# Patient Record
Sex: FEMALE | Race: WHITE | NOT HISPANIC OR LATINO | Employment: OTHER | URBAN - METROPOLITAN AREA
[De-identification: names, ages, dates, MRNs, and addresses within clinical notes are randomized per-mention and may not be internally consistent; named-entity substitution may affect disease eponyms.]

---

## 2017-06-14 ENCOUNTER — RECORDS - HEALTHEAST (OUTPATIENT)
Dept: LAB | Facility: CLINIC | Age: 77
End: 2017-06-14

## 2017-06-14 LAB
CHOLEST SERPL-MCNC: 266 MG/DL
FASTING STATUS PATIENT QL REPORTED: ABNORMAL
HDLC SERPL-MCNC: 58 MG/DL
LDLC SERPL CALC-MCNC: 165 MG/DL
TRIGL SERPL-MCNC: 216 MG/DL

## 2019-06-17 ENCOUNTER — RECORDS - HEALTHEAST (OUTPATIENT)
Dept: LAB | Facility: CLINIC | Age: 79
End: 2019-06-17

## 2019-06-17 LAB
ALBUMIN SERPL-MCNC: 4.1 G/DL (ref 3.5–5)
ALP SERPL-CCNC: 68 U/L (ref 45–120)
ALT SERPL W P-5'-P-CCNC: 18 U/L (ref 0–45)
ANION GAP SERPL CALCULATED.3IONS-SCNC: 8 MMOL/L (ref 5–18)
AST SERPL W P-5'-P-CCNC: 18 U/L (ref 0–40)
BILIRUB SERPL-MCNC: 0.4 MG/DL (ref 0–1)
BUN SERPL-MCNC: 24 MG/DL (ref 8–28)
CALCIUM SERPL-MCNC: 10.2 MG/DL (ref 8.5–10.5)
CHLORIDE BLD-SCNC: 102 MMOL/L (ref 98–107)
CHOLEST SERPL-MCNC: 189 MG/DL
CO2 SERPL-SCNC: 30 MMOL/L (ref 22–31)
CREAT SERPL-MCNC: 0.97 MG/DL (ref 0.6–1.1)
FASTING STATUS PATIENT QL REPORTED: ABNORMAL
GFR SERPL CREATININE-BSD FRML MDRD: 55 ML/MIN/1.73M2
GLUCOSE BLD-MCNC: 92 MG/DL (ref 70–125)
HDLC SERPL-MCNC: 63 MG/DL
LDLC SERPL CALC-MCNC: 79 MG/DL
POTASSIUM BLD-SCNC: 4.5 MMOL/L (ref 3.5–5)
PROT SERPL-MCNC: 6.5 G/DL (ref 6–8)
SODIUM SERPL-SCNC: 140 MMOL/L (ref 136–145)
TRIGL SERPL-MCNC: 236 MG/DL

## 2019-08-21 ENCOUNTER — RECORDS - HEALTHEAST (OUTPATIENT)
Dept: ADMINISTRATIVE | Facility: OTHER | Age: 79
End: 2019-08-21

## 2019-08-21 ENCOUNTER — AMBULATORY - HEALTHEAST (OUTPATIENT)
Dept: CARDIOLOGY | Facility: CLINIC | Age: 79
End: 2019-08-21

## 2019-08-23 ENCOUNTER — RECORDS - HEALTHEAST (OUTPATIENT)
Dept: ADMINISTRATIVE | Facility: OTHER | Age: 79
End: 2019-08-23

## 2019-08-23 ENCOUNTER — AMBULATORY - HEALTHEAST (OUTPATIENT)
Dept: CARDIOLOGY | Facility: CLINIC | Age: 79
End: 2019-08-23

## 2019-08-26 ENCOUNTER — OFFICE VISIT - HEALTHEAST (OUTPATIENT)
Dept: CARDIOLOGY | Facility: CLINIC | Age: 79
End: 2019-08-26

## 2019-08-26 DIAGNOSIS — I47.10 SVT (SUPRAVENTRICULAR TACHYCARDIA) (H): ICD-10-CM

## 2019-08-26 DIAGNOSIS — I47.20 VT (VENTRICULAR TACHYCARDIA) (H): ICD-10-CM

## 2019-08-26 DIAGNOSIS — R06.09 DOE (DYSPNEA ON EXERTION): ICD-10-CM

## 2019-08-26 RX ORDER — GINKGO BILOBA LEAF EXTRACT 60 MG
1 CAPSULE ORAL DAILY
Status: ON HOLD | COMMUNITY
Start: 2019-08-26 | End: 2022-08-11

## 2019-08-26 RX ORDER — ASCORBIC ACID 1000 MG
1 TABLET ORAL DAILY
Status: ON HOLD | COMMUNITY
Start: 2019-08-26 | End: 2022-08-11

## 2019-08-26 RX ORDER — ASPIRIN 325 MG
325 TABLET, DELAYED RELEASE (ENTERIC COATED) ORAL DAILY
Status: ON HOLD | COMMUNITY
Start: 2019-08-26 | End: 2022-08-12

## 2019-08-26 RX ORDER — CITALOPRAM HYDROBROMIDE 10 MG/1
10 TABLET ORAL DAILY
Status: SHIPPED | COMMUNITY
Start: 2019-08-26

## 2019-08-26 RX ORDER — ATORVASTATIN CALCIUM 10 MG/1
10 TABLET, FILM COATED ORAL DAILY
Status: SHIPPED | COMMUNITY
Start: 2019-08-26

## 2019-08-26 RX ORDER — ASCORBIC ACID 500 MG
500 TABLET ORAL DAILY
Status: SHIPPED | COMMUNITY
Start: 2019-08-26

## 2019-08-26 ASSESSMENT — MIFFLIN-ST. JEOR: SCORE: 1077.31

## 2019-09-03 ENCOUNTER — HOSPITAL ENCOUNTER (OUTPATIENT)
Dept: CARDIOLOGY | Facility: HOSPITAL | Age: 79
Discharge: HOME | End: 2019-09-03
Attending: INTERNAL MEDICINE

## 2019-09-03 DIAGNOSIS — R06.09 DOE (DYSPNEA ON EXERTION): ICD-10-CM

## 2019-09-03 DIAGNOSIS — I47.10 SVT (SUPRAVENTRICULAR TACHYCARDIA) (H): ICD-10-CM

## 2019-09-03 DIAGNOSIS — I47.20 VT (VENTRICULAR TACHYCARDIA) (H): ICD-10-CM

## 2019-09-03 DIAGNOSIS — R06.09 OTHER FORMS OF DYSPNEA: ICD-10-CM

## 2019-09-03 LAB
CV STRESS CURRENT BP HE: NORMAL
CV STRESS CURRENT HR HE: 100
CV STRESS CURRENT HR HE: 107
CV STRESS CURRENT HR HE: 108
CV STRESS CURRENT HR HE: 112
CV STRESS CURRENT HR HE: 114
CV STRESS CURRENT HR HE: 115
CV STRESS CURRENT HR HE: 115
CV STRESS CURRENT HR HE: 116
CV STRESS CURRENT HR HE: 116
CV STRESS CURRENT HR HE: 118
CV STRESS CURRENT HR HE: 118
CV STRESS CURRENT HR HE: 126
CV STRESS CURRENT HR HE: 73
CV STRESS CURRENT HR HE: 77
CV STRESS CURRENT HR HE: 79
CV STRESS CURRENT HR HE: 79
CV STRESS CURRENT HR HE: 81
CV STRESS CURRENT HR HE: 87
CV STRESS CURRENT HR HE: 89
CV STRESS CURRENT HR HE: 96
CV STRESS CURRENT HR HE: NORMAL
CV STRESS DEVIATION TIME HE: NORMAL
CV STRESS ECHO PERCENT HR HE: NORMAL
CV STRESS EXERCISE STAGE HE: NORMAL
CV STRESS FINAL RESTING BP HE: NORMAL
CV STRESS FINAL RESTING HR HE: 79
CV STRESS MAX HR HE: 128
CV STRESS MAX TREADMILL GRADE HE: 14
CV STRESS MAX TREADMILL SPEED HE: 3.4
CV STRESS PEAK DIA BP HE: NORMAL
CV STRESS PEAK SYS BP HE: NORMAL
CV STRESS PHASE HE: NORMAL
CV STRESS PROTOCOL HE: NORMAL
CV STRESS RESTING PT POSITION HE: NORMAL
CV STRESS RESTING PT POSITION HE: NORMAL
CV STRESS ST DEVIATION AMOUNT HE: NORMAL
CV STRESS ST DEVIATION ELEVATION HE: NORMAL
CV STRESS ST EVELATION AMOUNT HE: NORMAL
CV STRESS TEST TYPE HE: NORMAL
CV STRESS TOTAL STAGE TIME MIN 1 HE: NORMAL
ECHO EJECTION FRACTION ESTIMATED: 55 %
STRESS ECHO BASELINE BP: NORMAL
STRESS ECHO BASELINE HR: 72
STRESS ECHO CALCULATED PERCENT HR: 92 %
STRESS ECHO LAST STRESS BP: NORMAL
STRESS ECHO LAST STRESS HR: 126
STRESS ECHO POST ESTIMATED WORKLOAD: 7.3
STRESS ECHO POST EXERCISE DUR MIN: 6
STRESS ECHO POST EXERCISE DUR SEC: 0
STRESS ECHO TARGET HR: 118

## 2019-09-04 ENCOUNTER — AMBULATORY - HEALTHEAST (OUTPATIENT)
Dept: CARDIOLOGY | Facility: CLINIC | Age: 79
End: 2019-09-04

## 2019-09-04 ENCOUNTER — COMMUNICATION - HEALTHEAST (OUTPATIENT)
Dept: CARDIOLOGY | Facility: CLINIC | Age: 79
End: 2019-09-04

## 2019-09-04 DIAGNOSIS — I47.10 PAROXYSMAL SUPRAVENTRICULAR TACHYCARDIA (H): ICD-10-CM

## 2019-09-05 ENCOUNTER — COMMUNICATION - HEALTHEAST (OUTPATIENT)
Dept: CARDIOLOGY | Facility: CLINIC | Age: 79
End: 2019-09-05

## 2019-09-12 ENCOUNTER — OFFICE VISIT - HEALTHEAST (OUTPATIENT)
Dept: CARDIOLOGY | Facility: CLINIC | Age: 79
End: 2019-09-12

## 2019-09-12 DIAGNOSIS — I10 ESSENTIAL HYPERTENSION: ICD-10-CM

## 2019-09-12 DIAGNOSIS — Z86.718 HISTORY OF DEEP VENOUS THROMBOSIS: ICD-10-CM

## 2019-09-12 DIAGNOSIS — I47.10 SVT (SUPRAVENTRICULAR TACHYCARDIA) (H): ICD-10-CM

## 2019-09-13 LAB
ATRIAL RATE - MUSE: 57 BPM
DIASTOLIC BLOOD PRESSURE - MUSE: NORMAL
INTERPRETATION ECG - MUSE: NORMAL
P AXIS - MUSE: 68 DEGREES
PR INTERVAL - MUSE: 178 MS
QRS DURATION - MUSE: 90 MS
QT - MUSE: 412 MS
QTC - MUSE: 401 MS
R AXIS - MUSE: 53 DEGREES
SYSTOLIC BLOOD PRESSURE - MUSE: NORMAL
T AXIS - MUSE: 58 DEGREES
VENTRICULAR RATE- MUSE: 57 BPM

## 2019-09-16 ENCOUNTER — RECORDS - HEALTHEAST (OUTPATIENT)
Dept: ADMINISTRATIVE | Facility: OTHER | Age: 79
End: 2019-09-16

## 2019-09-16 ENCOUNTER — AMBULATORY - HEALTHEAST (OUTPATIENT)
Dept: CARDIOLOGY | Facility: CLINIC | Age: 79
End: 2019-09-16

## 2020-04-06 ENCOUNTER — COMMUNICATION - HEALTHEAST (OUTPATIENT)
Dept: CARDIOLOGY | Facility: CLINIC | Age: 80
End: 2020-04-06

## 2020-04-06 DIAGNOSIS — I47.10 PAROXYSMAL SUPRAVENTRICULAR TACHYCARDIA (H): ICD-10-CM

## 2020-07-21 ENCOUNTER — COMMUNICATION - HEALTHEAST (OUTPATIENT)
Dept: CARDIOLOGY | Facility: CLINIC | Age: 80
End: 2020-07-21

## 2020-07-21 DIAGNOSIS — I47.10 PAROXYSMAL SUPRAVENTRICULAR TACHYCARDIA (H): ICD-10-CM

## 2020-08-03 ENCOUNTER — COMMUNICATION - HEALTHEAST (OUTPATIENT)
Dept: CARDIOLOGY | Facility: CLINIC | Age: 80
End: 2020-08-03

## 2020-08-04 ENCOUNTER — OFFICE VISIT - HEALTHEAST (OUTPATIENT)
Dept: CARDIOLOGY | Facility: CLINIC | Age: 80
End: 2020-08-04

## 2020-08-04 DIAGNOSIS — I10 ESSENTIAL HYPERTENSION: ICD-10-CM

## 2020-08-04 DIAGNOSIS — I47.10 PAROXYSMAL SUPRAVENTRICULAR TACHYCARDIA (H): ICD-10-CM

## 2020-08-04 DIAGNOSIS — I47.10 SVT (SUPRAVENTRICULAR TACHYCARDIA) (H): ICD-10-CM

## 2020-08-04 ASSESSMENT — MIFFLIN-ST. JEOR: SCORE: 1041.02

## 2020-08-05 ENCOUNTER — COMMUNICATION - HEALTHEAST (OUTPATIENT)
Dept: CARDIOLOGY | Facility: CLINIC | Age: 80
End: 2020-08-05

## 2020-08-05 ENCOUNTER — RECORDS - HEALTHEAST (OUTPATIENT)
Dept: LAB | Facility: CLINIC | Age: 80
End: 2020-08-05

## 2020-08-05 DIAGNOSIS — I10 ESSENTIAL HYPERTENSION: ICD-10-CM

## 2020-08-05 DIAGNOSIS — I47.10 PAROXYSMAL SUPRAVENTRICULAR TACHYCARDIA (H): ICD-10-CM

## 2020-08-05 LAB
ALBUMIN SERPL-MCNC: 4 G/DL (ref 3.5–5)
ALP SERPL-CCNC: 66 U/L (ref 45–120)
ALT SERPL W P-5'-P-CCNC: 15 U/L (ref 0–45)
ANION GAP SERPL CALCULATED.3IONS-SCNC: 9 MMOL/L (ref 5–18)
AST SERPL W P-5'-P-CCNC: 15 U/L (ref 0–40)
BILIRUB SERPL-MCNC: 0.7 MG/DL (ref 0–1)
BUN SERPL-MCNC: 24 MG/DL (ref 8–28)
CALCIUM SERPL-MCNC: 9.7 MG/DL (ref 8.5–10.5)
CHLORIDE BLD-SCNC: 103 MMOL/L (ref 98–107)
CHOLEST SERPL-MCNC: 195 MG/DL
CO2 SERPL-SCNC: 29 MMOL/L (ref 22–31)
CREAT SERPL-MCNC: 1.12 MG/DL (ref 0.6–1.1)
FASTING STATUS PATIENT QL REPORTED: ABNORMAL
GFR SERPL CREATININE-BSD FRML MDRD: 47 ML/MIN/1.73M2
GLUCOSE BLD-MCNC: 104 MG/DL (ref 70–125)
HDLC SERPL-MCNC: 61 MG/DL
LDLC SERPL CALC-MCNC: 95 MG/DL
POTASSIUM BLD-SCNC: 4.5 MMOL/L (ref 3.5–5)
PROT SERPL-MCNC: 6.5 G/DL (ref 6–8)
SODIUM SERPL-SCNC: 141 MMOL/L (ref 136–145)
TRIGL SERPL-MCNC: 194 MG/DL

## 2021-02-01 ENCOUNTER — COMMUNICATION - HEALTHEAST (OUTPATIENT)
Dept: CARDIOLOGY | Facility: CLINIC | Age: 81
End: 2021-02-01

## 2021-02-01 DIAGNOSIS — I47.10 PAROXYSMAL SUPRAVENTRICULAR TACHYCARDIA (H): ICD-10-CM

## 2021-02-01 DIAGNOSIS — I10 ESSENTIAL HYPERTENSION: ICD-10-CM

## 2021-02-02 ENCOUNTER — COMMUNICATION - HEALTHEAST (OUTPATIENT)
Dept: CARDIOLOGY | Facility: CLINIC | Age: 81
End: 2021-02-02

## 2021-02-04 ENCOUNTER — COMMUNICATION - HEALTHEAST (OUTPATIENT)
Dept: CARDIOLOGY | Facility: CLINIC | Age: 81
End: 2021-02-04

## 2021-02-04 DIAGNOSIS — I47.10 PAROXYSMAL SUPRAVENTRICULAR TACHYCARDIA (H): ICD-10-CM

## 2021-02-04 DIAGNOSIS — I10 ESSENTIAL HYPERTENSION: ICD-10-CM

## 2021-02-04 RX ORDER — METOPROLOL SUCCINATE 50 MG/1
50 TABLET, EXTENDED RELEASE ORAL DAILY
Qty: 90 TABLET | Refills: 2 | Status: SHIPPED | OUTPATIENT
Start: 2021-02-04 | End: 2022-02-14 | Stop reason: DRUGHIGH

## 2021-03-03 ENCOUNTER — COMMUNICATION - HEALTHEAST (OUTPATIENT)
Dept: CARDIOLOGY | Facility: CLINIC | Age: 81
End: 2021-03-03

## 2021-03-03 DIAGNOSIS — I47.10 PAROXYSMAL SUPRAVENTRICULAR TACHYCARDIA (H): ICD-10-CM

## 2021-03-03 RX ORDER — FLECAINIDE ACETATE 50 MG/1
50 TABLET ORAL 2 TIMES DAILY
Qty: 180 TABLET | Refills: 1 | Status: SHIPPED | OUTPATIENT
Start: 2021-03-03 | End: 2021-08-02

## 2021-05-28 ENCOUNTER — RECORDS - HEALTHEAST (OUTPATIENT)
Dept: ADMINISTRATIVE | Facility: CLINIC | Age: 81
End: 2021-05-28

## 2021-06-01 NOTE — TELEPHONE ENCOUNTER
Pt contacted with results and recommendations as per Dr. Kumar she was advised to f/u with Dr. Kumar in 2 months time transferred to scheduling to arrange. Changed Flecainide rx to local pharmacy per patient request. sk/RN

## 2021-06-01 NOTE — TELEPHONE ENCOUNTER
Could patient follow-up with 1 of the [facility] nurse practitioners as I am treating her for paroxysmal atrial tachycardia and just want to see if she is doing okay with the flecainide?    KL

## 2021-06-01 NOTE — TELEPHONE ENCOUNTER
Message sent to sched with instructions to arrange f/u with AF NP prior to patient leaving [place] in Oct.  mg

## 2021-06-01 NOTE — PATIENT INSTRUCTIONS - HE
Destiney Agosto,    It was a pleasure to see you today at the [facility].     My recommendations after this visit include:    Flecainide appears to be working in your treatment of atrial tachycardia.    Please let us know if your symptoms do again worsen so we can evaluate for any atrial fibrillation which would be an additional arrhythmia.    Otherwise, in the meantime continue your flecainide and metoprolol.  Enjoy your winter in warmer weather and follow-up with Dr. Kumar when you return in the spring time.    Monalisa Loving NP-DAVY  [facility] [place] [facility], Electrophysiology  [phone]

## 2021-06-01 NOTE — TELEPHONE ENCOUNTER
----- Message from Tianna Kumar MD sent at 9/4/2019  2:03 PM CDT -----  Please let the patient know that her stress echo was negative for any signs of blood flow issues to her heart muscle.  At this point would consider a trial of flecainide 50 mg twice daily, in addition to her current dose of metoprolol.  Would have the patient follow-up with me in 2 months.    FLORIAN Kumar

## 2021-06-01 NOTE — TELEPHONE ENCOUNTER
----- Message from Milton Rodgers sent at 9/4/2019  3:27 PM CDT -----  Contact: Pt  General phone call:    Caller: Pt    Primary cardiologist: Dr Kumar    Detailed reason for call: Pt was to have a 2 MO FU per [employer] - Pt wasn't able to make anything work prior to her leaving the state in late October (did not take apts at [facility] and wasn't able to make 10/10 at [facility]) - Pt said she would follow up with a Dr out of [facility] if needed - wanted to let Dr Kumar know - Please call Pt if there are recommendations - thank you     New or active symptoms? na  Best phone number: home  Best time to contact: any  Ok to leave a detailed message? yes  Device? no    Additional Info:

## 2021-06-01 NOTE — TELEPHONE ENCOUNTER
Please review patient update regarding scheduling 2mo f/u as recommended after making medication changes (refer to stress echo results note) - should patient f/u with another provider?  mg

## 2021-06-03 VITALS
BODY MASS INDEX: 27.4 KG/M2 | SYSTOLIC BLOOD PRESSURE: 126 MMHG | HEART RATE: 55 BPM | WEIGHT: 145 LBS | RESPIRATION RATE: 16 BRPM | DIASTOLIC BLOOD PRESSURE: 64 MMHG | OXYGEN SATURATION: 97 %

## 2021-06-03 VITALS — BODY MASS INDEX: 28.51 KG/M2 | WEIGHT: 151 LBS | HEIGHT: 61 IN

## 2021-06-04 VITALS
HEART RATE: 60 BPM | SYSTOLIC BLOOD PRESSURE: 140 MMHG | RESPIRATION RATE: 16 BRPM | BODY MASS INDEX: 27 KG/M2 | WEIGHT: 143 LBS | HEIGHT: 61 IN | DIASTOLIC BLOOD PRESSURE: 64 MMHG

## 2021-06-10 NOTE — TELEPHONE ENCOUNTER
Phone call to patient - informed her of Dr. Kumar's response/recommendations - patient verbalized understanding that when her Rx for Metoprolol Succ 25mg arrives by mail she should take 2 tabs daily to replace Metoprolol Tart 25mg two times a day.  mg

## 2021-06-10 NOTE — TELEPHONE ENCOUNTER
Return call to patient who stated she has been taking Metoprolol Tart 25mg two times a day not Succinate which she realized after her appt yesterday - Rx for Tartrate was prescribed by [place] physician and Dr. Kumar sent Succinate Rx in yesterday but she did not request it.    Explained to patient that her med list at 8-4-20 visit indicated that she was taking Metoprolol Succ 25mg daily which is why Dr. Kumar refilled Rx, along with Flecainide - explained differences between Tartrate and Succinate - patient stated she would prefer Succinate because it's daily.    Informed patient that update would be forwarded to Dr. Kumar with request to confirm correct dosage - understanding verbalized.    Please review update and patient's request to change to Succinate - also confirm that requested Rx for Metoprolol Succ 25mg daily is equivalent to what patient is presently taking, Metoprolol Tart 25mg two times a day.  mg

## 2021-06-10 NOTE — TELEPHONE ENCOUNTER
Wellness Screening Tool  Symptom Screening:  Do you have one of the following NEW symptoms:    Fever (subjective or >100.0)?  No    A new cough?  No    Shortness of breath?  No     Chills? No     New loss of taste or smell? No     Generalized body aches? No     New persistent headache? No     New sore throat? No     Nausea, vomiting, or diarrhea?  No    Within the past 3 weeks, have you been exposed to someone with a known positive illness below:    COVID-19 (known or suspected)?  No    Chicken pox?  No    Mealses?  No    Pertussis?  No    Patient notified of visitor policy- They may have one person accompany them to their appointment, but they will need to wear a mask and will be screened upon arrival for symptoms: Yes  Pt informed to wear a mask: Yes  Pt notified if they develop any symptoms listed above, prior to their appointment, they are to call the clinic directly at [phone] for further instructions.  Yes  Patient's appointment status: Patient will be seen in clinic as scheduled on Tues 8-4-20 @ 1199 in  clinic appt with Dr. Kumar.  mg

## 2021-06-10 NOTE — TELEPHONE ENCOUNTER
----- Message from Elizabeth Josias sent at 8/5/2020 10:59 AM CDT -----  General phone call:    Caller: Patient  Primary cardiologist: Dr. Kumar  Detailed reason for call: Patient has questions about renewed medications she has plenty.  Patient has been taking metoprolol titrate, and Dr Kumar ordered the Metoprolol succinate.  Patient liked the Titrate.    New or active symptoms?   Best phone number: [phone]  Best time to contact: Anytime  Ok to leave a detailed message? Yes  Device? no    Additional Info:

## 2021-06-14 NOTE — TELEPHONE ENCOUNTER
----- Message from YUMIKO Mercedes sent at 2/2/2021  1:09 PM CST -----  Regarding: LAC[facility] PATIENT  General phone call:    Caller: Patient    Primary cardiologist: LACY    Detailed reason for call: Patient would like a from you, re: her medications    Best phone number: [phone]    Best time to contact: any    Ok to leave a detailed message? Yes    Device? no    Additional Info:

## 2021-06-14 NOTE — TELEPHONE ENCOUNTER
----- Message from YUMIKO Mercedes sent at 2/1/2021  2:30 PM CST -----  Regarding: LAC[facility] PATIENT  General phone call:    Caller: Patient    Primary cardiologist: LACY    Detailed reason for call: Patient has a question re: metoprolol succinate (TOPROL-XL) 25 MG    Best phone number: [phone]    Best time to contact: any    Ok to leave a detailed message? Yes    Device? no    Additional Info:

## 2021-06-14 NOTE — TELEPHONE ENCOUNTER
"Return call to patient who requested confirmation that Metoprolol Succ Rx was sent to preferred pharmacy because her spouse was informed this am that Rx was not rec'd.    Informed patient that records indicate \"E-Prescribing Status: Receipt confirmed by pharmacy (2/1/2021  3:11 PM CST)\" and that f/u call would be made to [facility] ([phone]) - understanding verbalized.      Phone call to [facility] Rx pharmacist who confirmed Rx in work queue awaiting to be filled which she would expedite.     Return call to patient with update - understanding verbalized. mg  "

## 2021-06-14 NOTE — TELEPHONE ENCOUNTER
Return call to patient who stated she is due for new Metoprolol Succ Rx and explained that dose was increased and she was taking 2 tabs.    Confirmed per 8-5-20 phone note, Dr. Kumar had changed Metoprolol Tart to Succinate and was to call back when original Rx was completed so new Rx for 50mg tabs could be submitted.    Patient confirmed preferred pharmacy for new Rx and pending f/u 8/2021 - order placed per protocol.  mg

## 2021-06-15 NOTE — TELEPHONE ENCOUNTER
----- Message from YUMIKO Mercedes sent at 2/4/2021  1:36 PM CST -----  Regarding: [facility] PATIENT  General phone call:    Caller: Patient    Primary cardiologist: LACY    Detailed reason for call: patient wants to talk to you, RE the RX issue that you and her have been dealing with.    Best phone number: [phone]    Best time to contact: any    Ok to leave a detailed message? Yes    Device? no    Additional Info:

## 2021-06-15 NOTE — TELEPHONE ENCOUNTER
"Return call to patient who stated her spouse recently found out that [facility] is not their mail order pharmacy any more, it is now [facility] and patient has been out of Metoprolol Succ for \"a couple of weeks now\" as her spouse had attempted to sort out the pharmacy coverage.    Patient requested 30 day supply be sent to [facility] in [place], [place] and then 90 day to [facility] - informed patient that request would be addressed - understanding verbalized. mg  "

## 2021-06-16 PROBLEM — I10 ESSENTIAL HYPERTENSION: Status: ACTIVE | Noted: 2019-09-12

## 2021-06-16 PROBLEM — I47.10 SVT (SUPRAVENTRICULAR TACHYCARDIA) (H): Status: ACTIVE | Noted: 2019-09-12

## 2021-06-16 PROBLEM — Z86.718 HISTORY OF DEEP VENOUS THROMBOSIS: Status: ACTIVE | Noted: 2019-09-12

## 2021-06-27 NOTE — PROGRESS NOTES
Progress Notes by Tianna Kumar MD at 8/26/2019  8:30 AM     Author: Tianna Kumar MD Service: -- Author Type: Physician    Filed: 8/26/2019  9:20 AM Encounter Date: 8/26/2019 Status: Signed    : Tianna Kumar MD (Physician)           Click to link to [facility]     [facility] HEART [facility] NOTE    Thank you, Dr. Moreno, for asking the [facility] team to see Ms. Destiney Agosto to evaluate palpitations.    Assessment/Recommendations   Assessment:    1.  Palpitations.  Patient wore an event monitor this past spring while in [place] demonstrating 2 runs of nonsustained ventricular tachycardia as well as 32 runs of a supraventricular tachycardia thought to be atrial tachycardia with variable block although this raises the question of paroxysmal atrial fibrillation although they felt this was less likely.  No change in medication was made at that time and she is now referred here for further recommendation.  She denies any associated symptoms of lightheadedness or near syncope.  We did talk about options of potentially increasing her metoprolol to 37-1/2 mg daily or adding flecainide at a low dose.  I would recommend a stress echo study prior to consideration of flecainide although understand she had coronary CT angiogram in 2007 which was negative for coronary disease.  2.  Essential hypertension, well controlled  3.  Hypercholesterolemia, now on statin therapy    Plan:  1.  Arrange stress echo study with further recommendations to follow       History of Present Illness    Ms. Destiney Agosto is a 79 y.o. female with history of essential hypertension, hypercholesterolemia and previous history of PVCs who presents to the cardiac clinic today for evaluation of her recent event monitor.  While wintering in [place], she had an event monitor performed for symptoms of palpitations.  She describes them as brief episodes of rapid heart beating.  These are not associated with symptoms of  lightheadedness or near syncope.  She denies any associated chest discomfort or shortness of breath.  She does report mild exertional dyspnea with climbing hills or climbing stairs but not with level ground.  There is no accompanying chest discomfort.  She denies orthopnea, PND or lower extremity edema.  She does report a prior work-up at the [facility] demonstrating a small patent ductus although I cannot find any records of this.  No treatment was recommended.    ECG (personally reviewed): No ECG today.  An event monitor performed in [place] revealed 32 runs of supraventricular tachycardia with the fastest consisting of 11 beats at a rate of 190 bpm.  The longest episode was 13 beats with a rate of 124.  Some episodes of SVT appeared to be possible atrial tachycardia with variable block.  No atrial fibrillation noted.  One run of ventricular tachycardia lasting 4 beats at a rate of 164 noted.  Average heart rate was 76 bpm with a minimum rate of 53 and a maximum rate of 190.    Cardiac Imaging Studies (personally reviewed): No recent cardiac imaging     Physical Examination Review of Systems   Vitals:    08/26/19 0836   BP: 130/78   Pulse: 64   Resp: 16     Body mass index is 28.53 kg/m .  Wt Readings from Last 3 Encounters:   08/26/19 151 lb (68.5 kg)     General Appearance:   Awake, Alert, No acute distress.   HEENT:  No scleral icterus; the mucous membranes were pink and moist.   Neck: No cervical bruits or jugular venous distention    Chest: The spine was straight. The chest was symmetric.   Lungs:   Respirations unlabored; the lungs are clear to auscultation. No wheezing   Cardiovascular:    Regular rate and rhythm.  S1, S2 normal.  No murmur, gallop or rub   Abdomen:  No organomegaly, masses, bruits, or tenderness. Bowels sounds are present   Extremities:  No peripheral edema, clubbing or cyanosis.  Distal pulses full and symmetric.   Skin: No xanthelasma. Warm, Dry.   Musculoskeletal: No tenderness.    Neurologic: Mood and affect are appropriate.    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 Family History   Problem Relation Age of Onset     Stroke Mother      Aortic aneurysm Father     Social History     Socioeconomic History     Marital status:      Spouse name: Not on file     Number of children: Not on file     Years of education: Not on file     Highest education level: Not on file   Occupational History     Not on file   Social Needs     Financial resource strain: Not on file     Food insecurity:     Worry: Not on file     Inability: Not on file     Transportation needs:     Medical: Not on file     Non-medical: Not on file   Tobacco Use     Smoking status: Never Smoker     Smokeless tobacco: Never Used   Substance and Sexual Activity     Alcohol use: Yes     Comment: occasional     Drug use: Never     Sexual activity: Not on file   Lifestyle     Physical activity:     Days per week: Not on file     Minutes per session: Not on file     Stress: Not on file   Relationships     Social connections:     Talks on phone: Not on file     Gets together: Not on file     Attends [employer] service: Not on file     Active member of club or organization: Not on file     Attends meetings of clubs or organizations: Not on file     Relationship status: Not on file     Intimate partner violence:     Fear of current or ex partner: Not on file     Emotionally abused: Not on file     Physically abused: Not on file     Forced sexual activity: Not on file    Other Topics Concern     Not on file   Social History Narrative     Not on file          Medications  Allergies   Current Outpatient Medications   Medication Sig Dispense Refill     ascorbic acid, vitamin C, (ASCORBIC ACID WITH CARYN HIPS) 500 MG tablet Take 500 mg by mouth daily.       aspirin 325 MG EC tablet Take 325 mg by mouth daily.       atorvastatin (LIPITOR) 10 MG tablet Take 10 mg by mouth at bedtime.       b complex vitamins tablet Take 1 tablet by mouth daily.       biotin 5,000 mcg TbDL Take 1 tablet by mouth daily.       CALCIUM-MAGNESIUM-ZINC ORAL Take 1 tablet by mouth 2 (two) times a day.       cholecalciferol, vitamin D3, (VITAMIN D3) 2,000 unit capsule Take 2,000 Units by mouth 2 (two) times a day.       citalopram (CELEXA) 10 MG tablet Take 10 mg by mouth daily.       cranberry fruit extract (CRANBERRY ORAL) Take by mouth daily.       folic acid/multivit-min/lutein (CENTRUM SILVER ORAL) Take 1 tablet by mouth daily.       garlic 1,000 mg cap Take 2 capsules by mouth daily.       ginkgo biloba 40 mg Tab Take 1 tablet by mouth daily.       ginseng 100 mg cap Take 1 capsule by mouth daily.       Lactobacillus acidophilus (PROBIOTIC ORAL) Take 1 tablet by mouth daily.       metoprolol succinate (TOPROL-XL) 25 MG Take 25 mg by mouth daily.       No current facility-administered medications for this visit.       No Known Allergies      Lab Results    Chemistry/lipid CBC Cardiac Enzymes/BNP/TSH/INR   Lab Results   Component Value Date    CHOL 189 06/17/2019    HDL 63 06/17/2019    LDLCALC 79 06/17/2019    TRIG 236 (H) 06/17/2019    CREATININE 0.97 06/17/2019    BUN 24 06/17/2019    K 4.5 06/17/2019     06/17/2019     06/17/2019    CO2 30 06/17/2019    No results found for: WBC, HGB, HCT, MCV, PLT No results found for: CKTOTAL, CKMB, CKMBINDEX, TROPONINI, BNP, TSH, INR

## 2021-06-28 NOTE — PROGRESS NOTES
"Progress Notes by Monalisa Loving CNP at 9/12/2019  1:30 PM     Author: Monalisa Loving CNP Service: -- Author Type: Nurse Practitioner    Filed: 9/12/2019  2:11 PM Encounter Date: 9/12/2019 Status: Signed    : Monalisa Loving CNP (Nurse Practitioner)           Click to link to [facility]     [facility] HEART [facility] ELECTROPHYSIOLOGY NOTE      Assessment/Recommendations   Assessment/Plan:    Diagnoses and all orders for this visit:    SVT (supraventricular tachycardia) (H)  Symptoms controlled with flecainide.  Continue flecainide and metoprolol.  We did discuss that PAT can be a precursor to PAF.  Therefore, if she does continue to develop symptoms of irregular heartbeats or palpitations she should again notify us so we can follow-up to ensure there is no A. fib, increasing her risk of CVA.  She does verbalize understanding and will keep us updated if required.    EKG checked due to bradycardia, she is asymptomatic.  Heart rate 57 bpm.  No prolonged QTC    Essential hypertension  Blood pressure is well controlled on metoprolol alone.  She is borderline bradycardic so will not increase at this time.  CPM.      Follow up in approximately 6 months, with Dr. Kumar when she returns for the spring.     History of Present Illness    Ms. Destiney Agosto is a very pleasant 79 y.o. female who comes in today for EP follow-up from starting flecainide therapy due to abnormal event monitor with runs of supraventricular tachycardia and atrial tachycardia. Destiney Agosto has a known history of hypertension, hypercholesterolemia, and DVT.  She has previously been on Xarelto for right upper thigh DVT, she is worried if she ever has to take anticoagulation again as she was told it was not safe long-term.  She does take a full dose of aspirin to \"thin my blood.\"  Her mom had multiple strokes, and she is trying to prevent that.    Patient saw Dr. Kumar 8/26/2019, started flecainide that " evening.  She then underwent stress echocardiogram to rule out CAD due to risk factors.  That was normal.  Patient states since starting the flecainide her symptoms of thumping heart have dissipated.  She does at times still feel a sensation in her throat area, however none of the palpitations as she had previously noted.    She  denies chest pain, palpitations, shortness of breath, paroxysmal nocturnal dyspnea, orthopnea, lightheadedness, dizziness, pre-syncope, or syncope.    An event monitor performed in [place] revealed 32 runs of supraventricular tachycardia with the fastest consisting of 11 beats at a rate of 190 bpm.  The longest episode was 13 beats with a rate of 124.  Some episodes of SVT appeared to be possible atrial tachycardia with variable block.  No atrial fibrillation noted.  One run of ventricular tachycardia lasting 4 beats at a rate of 164 noted.  Average heart rate was 76 bpm with a minimum rate of 53 and a maximum rate of 190.    Cardiographics (personally reviewed):  Results for orders placed during the hospital encounter of 09/03/19   Echo Stress Exercise [ECH07] 09/03/2019    Narrative   The patient's exercise tolerance was normal. Patient reported dyspnea   with exercise but no chest discomfort.    The stress electrocardiogram is negative for inducible ischemic EKG   changes.    Left ventricle ejection fraction is normal at baseline. The estimated   left ventricular ejection fraction is 55% without wall motion abnormality.   No significant valve disease.    Stress echocardiogram is negative for inducible ischemia.    No previous study for comparison.             Problem List:  Patient Active Problem List   Diagnosis     SVT (supraventricular tachycardia) (H)     Essential hypertension     History of deep venous thrombosis       Physical Examination Review of Systems   Vitals:    09/12/19 1339   BP: 126/64   Pulse: (!) 55   Resp: 16   SpO2: 97%     Body mass index is 27.4 kg/m .  Wt Readings  from Last 3 Encounters:   09/12/19 145 lb (65.8 kg)   08/26/19 151 lb (68.5 kg)     General Appearance:   Alert, well-appearing and in no acute distress.   HEENT: Atraumatic, normocephalic.  No scleral icterus, normal conjunctivae; mucous membranes pink and moist.     Chest: Chest symmetric   Lungs:   Respirations unlabored: Lungs are clear to auscultation.   Cardiovascular:   Normal first and second heart sounds with no murmurs, rubs, or gallops.  Regular, luis. Normal JVD, no edema.   Abdomen:  Soft, nontender, nondistended   Extremities: No cyanosis or clubbing   Musculoskeletal: Moves all extremities   Neurologic: Mood and affect are appropriate, alert and oriented to person, place, time, and situation    General: WNL  Eyes: WNL  Ears/Nose/Throat: WNL  Lungs: WNL  Heart: Irregular Heartbeat  Stomach: WNL  Bladder: WNL  Muscle/Joints: WNL  Skin: WNL  Nervous System: WNL  Mental Health: WNL     Blood: WNL       Medical History  Surgical History Family History Social History   Past Medical History:   Diagnosis Date     Arrhythmia 2007    PVC's with bigeminy, trigeminy     Hyperlipidemia      Hypertension     No past surgical history on file. Family History   Problem Relation Age of Onset     Stroke Mother      Aortic aneurysm Father     Social History     Socioeconomic History     Marital status:      Spouse name: Not on file     Number of children: Not on file     Years of education: Not on file     Highest education level: Not on file   Occupational History     Not on file   Social Needs     Financial resource strain: Not on file     Food insecurity:     Worry: Not on file     Inability: Not on file     Transportation needs:     Medical: Not on file     Non-medical: Not on file   Tobacco Use     Smoking status: Never Smoker     Smokeless tobacco: Never Used   Substance and Sexual Activity     Alcohol use: Yes     Comment: occasional     Drug use: Never     Sexual activity: Not on file   Lifestyle      Physical activity:     Days per week: Not on file     Minutes per session: Not on file     Stress: Not on file   Relationships     Social connections:     Talks on phone: Not on file     Gets together: Not on file     Attends Latter day service: Not on file     Active member of club or organization: Not on file     Attends meetings of clubs or organizations: Not on file     Relationship status: Not on file     Intimate partner violence:     Fear of current or ex partner: Not on file     Emotionally abused: Not on file     Physically abused: Not on file     Forced sexual activity: Not on file   Other Topics Concern     Not on file   Social History Narrative     Not on file          Medications  Allergies   Current Outpatient Medications   Medication Sig Dispense Refill     ascorbic acid, vitamin C, (ASCORBIC ACID WITH CARYN HIPS) 500 MG tablet Take 500 mg by mouth daily.       aspirin 325 MG EC tablet Take 325 mg by mouth daily.       atorvastatin (LIPITOR) 10 MG tablet Take 10 mg by mouth at bedtime.       b complex vitamins tablet Take 1 tablet by mouth daily.       biotin 5,000 mcg TbDL Take 1 tablet by mouth daily.       CALCIUM-MAGNESIUM-ZINC ORAL Take 1 tablet by mouth 2 (two) times a day.       cholecalciferol, vitamin D3, (VITAMIN D3) 2,000 unit capsule Take 2,000 Units by mouth 2 (two) times a day.       citalopram (CELEXA) 10 MG tablet Take 10 mg by mouth daily.       cranberry fruit extract (CRANBERRY ORAL) Take by mouth daily.       flecainide (TAMBOCOR) 50 MG tablet Take 1 tablet (50 mg total) by mouth 2 (two) times a day. 90 tablet 3     folic acid/multivit-min/lutein (CENTRUM SILVER ORAL) Take 1 tablet by mouth daily.       garlic 1,000 mg cap Take 2 capsules by mouth daily.       ginkgo biloba 40 mg Tab Take 1 tablet by mouth daily.       ginseng 100 mg cap Take 1 capsule by mouth daily.       Lactobacillus acidophilus (PROBIOTIC ORAL) Take 1 tablet by mouth daily.       metoprolol succinate (TOPROL-XL)  25 MG Take 25 mg by mouth daily.       No current facility-administered medications for this visit.       No Known Allergies   Medical, surgical, family, social history, and medications were all reviewed and updated as necessary.   Lab Results    Chemistry CBC/INR CHOLESTROL   Lab Results   Component Value Date    CREATININE 0.97 06/17/2019    BUN 24 06/17/2019     06/17/2019    K 4.5 06/17/2019     06/17/2019    CO2 30 06/17/2019     Creatinine (mg/dL)   Date Value   06/17/2019 0.97   06/14/2017 1.04     No results found for: BNP No results found for: WBC, HGB, HCT, MCV, PLT  No results found for: INR   Lab Results   Component Value Date    CHOL 189 06/17/2019    HDL 63 06/17/2019    LDLCALC 79 06/17/2019    TRIG 236 (H) 06/17/2019          This note has been dictated using voice recognition software. Any grammatical, typographical, or context distortions are unintentional and inherent to the software.    MARSHALL Small  [facility]   Electrophysiology  [phone]

## 2021-06-29 NOTE — PROGRESS NOTES
Progress Notes by Tianna Kumar MD at 8/4/2020  9:50 AM     Author: Tianna Kumar MD Service: -- Author Type: Physician    Filed: 8/4/2020 10:30 AM Encounter Date: 8/4/2020 Status: Signed    : Tianna Kumar MD (Physician)           Thank you, Dr. Moreno, for asking the [facility] team to see Ms. Destiney Agosto to follow-up on paroxysmal supraventricular tachycardia.    Assessment/Recommendations   Assessment:    1.  Paroxysmal supraventricular tachycardia, fairly well suppressed on current dose of flecainide and metoprolol.  She reports a decrease in episodes from occurring daily to now once every couple of weeks.  These episodes are very brief in duration.  Did bring up the option of increasing her dose of flecainide although she is content to stay on her current dose for now.  2.  Mild essential hypertension, controlled  3.  Mild hypercholesterolemia, on low-dose statin therapy      Plan:  1.  Continue current medications  2.  Follow-up with me in 1 year or sooner if increase in palpitations       History of Present Illness    Ms. Destiney Agosto is a 80 y.o. female with history of mild essential hypertension, hypercholesterolemia and PSVT who returns to the office today for a follow-up visit.  Since beginning flecainide late last summer, she has noted a marked decrease in her symptoms of palpitations.  She went from having episodes occurring daily now down to every couple of weeks and lasting only for a few seconds.  She is quite happy with how she has done on this medication.  I did bring up the option of increasing her dose of flecainide but at this point she is comfortable with staying on her current dosage.    ECG (personally reviewed): No ECG today    Cardiac Imaging Studies (personally reviewed): No recent imaging     Physical Examination Review of Systems   Vitals:    08/04/20 0959   BP: 140/64   Pulse: 60   Resp: 16     Body mass index is 27.02 kg/m .  Wt Readings from Last 3  Encounters:   08/04/20 143 lb (64.9 kg)   09/12/19 145 lb (65.8 kg)   08/26/19 151 lb (68.5 kg)     General Appearance:   Awake, Alert, No acute distress.   HEENT:  No scleral icterus; the mucous membranes were pink and moist.   Neck: No cervical bruits or jugular venous distention    Chest: The spine was straight. The chest was symmetric.   Lungs:   Respirations unlabored; the lungs are clear to auscultation. No wheezing   Cardiovascular:    Regular rate and rhythm.  S1, S2 normal.  No murmur or gallop   Abdomen:  No organomegaly, masses, bruits, or tenderness. Bowels sounds are present   Extremities:  No peripheral edema bilaterally.   Skin: No xanthelasma. Warm, Dry.   Musculoskeletal: No tenderness.   Neurologic: Mood and affect are appropriate.    General: WNL  Eyes: WNL  Ears/Nose/Throat: WNL  Lungs: WNL  Heart: WNL  Stomach: Diarrhea  Bladder: WNL  Muscle/Joints: WNL  Skin: WNL  Nervous System: WNL  Mental Health: WNL     Blood: WNL     Medical History  Surgical History Family History Social History   Past Medical History:   Diagnosis Date     Arrhythmia 2007    PVC's with bigeminy, trigeminy     Hyperlipidemia      Hypertension     No past surgical history on file. Family History   Problem Relation Age of Onset     Stroke Mother      Aortic aneurysm Father     Social History     Socioeconomic History     Marital status:      Spouse name: Not on file     Number of children: Not on file     Years of education: Not on file     Highest education level: Not on file   Occupational History     Not on file   Social Needs     Financial resource strain: Not on file     Food insecurity     Worry: Not on file     Inability: Not on file     Transportation needs     Medical: Not on file     Non-medical: Not on file   Tobacco Use     Smoking status: Never Smoker     Smokeless tobacco: Never Used   Substance and Sexual Activity     Alcohol use: Yes     Comment: occasional     Drug use: Never     Sexual activity: Not  on file   Lifestyle     Physical activity     Days per week: Not on file     Minutes per session: Not on file     Stress: Not on file   Relationships     Social connections     Talks on phone: Not on file     Gets together: Not on file     Attends Alevism service: Not on file     Active member of club or organization: Not on file     Attends meetings of clubs or organizations: Not on file     Relationship status: Not on file     Intimate partner violence     Fear of current or ex partner: Not on file     Emotionally abused: Not on file     Physically abused: Not on file     Forced sexual activity: Not on file   Other Topics Concern     Not on file   Social History Narrative     Not on file          Medications  Allergies   Current Outpatient Medications   Medication Sig Dispense Refill     ascorbic acid, vitamin C, (ASCORBIC ACID WITH CARYN HIPS) 500 MG tablet Take 500 mg by mouth daily.       aspirin 325 MG EC tablet Take 325 mg by mouth daily.       atorvastatin (LIPITOR) 10 MG tablet Take 10 mg by mouth at bedtime.       b complex vitamins tablet Take 1 tablet by mouth daily.       biotin 5,000 mcg TbDL Take 1 tablet by mouth daily.       CALCIUM-MAGNESIUM-ZINC ORAL Take 1 tablet by mouth 2 (two) times a day.       cholecalciferol, vitamin D3, (VITAMIN D3) 2,000 unit capsule Take 2,000 Units by mouth 2 (two) times a day.       citalopram (CELEXA) 10 MG tablet Take 10 mg by mouth daily.       cranberry fruit extract (CRANBERRY ORAL) Take by mouth daily.       flecainide (TAMBOCOR) 50 MG tablet Take 1 tablet (50 mg total) by mouth 2 (two) times a day. 180 tablet 3     folic acid/multivit-min/lutein (CENTRUM SILVER ORAL) Take 1 tablet by mouth daily.       garlic 1,000 mg cap Take 2 capsules by mouth daily.       ginkgo biloba 40 mg Tab Take 1 tablet by mouth daily.       ginseng 100 mg cap Take 1 capsule by mouth daily.       Lactobacillus acidophilus (PROBIOTIC ORAL) Take 1 tablet by mouth daily.       metoprolol  succinate (TOPROL-XL) 25 MG Take 1 tablet (25 mg total) by mouth daily. 90 tablet 3     No current facility-administered medications for this visit.       No Known Allergies      Lab Results    Chemistry/lipid CBC Cardiac Enzymes/BNP/TSH/INR   Lab Results   Component Value Date    CHOL 189 06/17/2019    HDL 63 06/17/2019    LDLCALC 79 06/17/2019    TRIG 236 (H) 06/17/2019    CREATININE 0.97 06/17/2019    BUN 24 06/17/2019    K 4.5 06/17/2019     06/17/2019     06/17/2019    CO2 30 06/17/2019    No results found for: WBC, HGB, HCT, MCV, PLT No results found for: CKTOTAL, CKMB, CKMBINDEX, TROPONINI, BNP, TSH, INR

## 2021-08-02 DIAGNOSIS — I47.10 PAROXYSMAL SUPRAVENTRICULAR TACHYCARDIA (H): ICD-10-CM

## 2021-08-02 RX ORDER — FLECAINIDE ACETATE 50 MG/1
50 TABLET ORAL 2 TIMES DAILY
Qty: 180 TABLET | Refills: 0 | Status: SHIPPED | OUTPATIENT
Start: 2021-08-02 | End: 2021-09-30

## 2021-09-01 ENCOUNTER — OFFICE VISIT (OUTPATIENT)
Dept: CARDIOLOGY | Facility: CLINIC | Age: 81
End: 2021-09-01
Payer: MEDICARE

## 2021-09-01 VITALS
SYSTOLIC BLOOD PRESSURE: 126 MMHG | RESPIRATION RATE: 16 BRPM | BODY MASS INDEX: 27 KG/M2 | DIASTOLIC BLOOD PRESSURE: 70 MMHG | HEIGHT: 61 IN | HEART RATE: 56 BPM | WEIGHT: 143 LBS

## 2021-09-01 DIAGNOSIS — I47.10 SVT (SUPRAVENTRICULAR TACHYCARDIA) (H): Primary | ICD-10-CM

## 2021-09-01 DIAGNOSIS — I10 ESSENTIAL HYPERTENSION: ICD-10-CM

## 2021-09-01 DIAGNOSIS — R00.1 SINUS BRADYCARDIA: ICD-10-CM

## 2021-09-01 DIAGNOSIS — R07.9 CHEST PAIN, UNSPECIFIED TYPE: ICD-10-CM

## 2021-09-01 LAB
ATRIAL RATE - MUSE: 53 BPM
DIASTOLIC BLOOD PRESSURE - MUSE: NORMAL MMHG
INTERPRETATION ECG - MUSE: NORMAL
P AXIS - MUSE: 67 DEGREES
PR INTERVAL - MUSE: 178 MS
QRS DURATION - MUSE: 92 MS
QT - MUSE: 426 MS
QTC - MUSE: 399 MS
R AXIS - MUSE: 16 DEGREES
SYSTOLIC BLOOD PRESSURE - MUSE: NORMAL MMHG
T AXIS - MUSE: 28 DEGREES
VENTRICULAR RATE- MUSE: 53 BPM

## 2021-09-01 PROCEDURE — 99214 OFFICE O/P EST MOD 30 MIN: CPT | Performed by: INTERNAL MEDICINE

## 2021-09-01 PROCEDURE — 93000 ELECTROCARDIOGRAM COMPLETE: CPT | Performed by: INTERNAL MEDICINE

## 2021-09-01 RX ORDER — ACETAMINOPHEN 160 MG/5ML
SUSPENSION, ORAL (FINAL DOSE FORM) ORAL
Status: ON HOLD | COMMUNITY
End: 2022-08-11

## 2021-09-01 RX ORDER — CALCIUM CARBONATE/VITAMIN D3 600 MG-10
1 TABLET ORAL DAILY
COMMUNITY

## 2021-09-01 ASSESSMENT — MIFFLIN-ST. JEOR: SCORE: 1041.02

## 2021-09-01 NOTE — LETTER
9/1/2021    Patrick Moreno MD  [facility] [address]  [facility] [address]    RE: Destiney Agosto       Dear Colleague,    I had the pleasure of seeing Destiney Agosto in the [facility].        Thank you, Dr. Patrick Moreno, for asking the [facility] team to see Ms. Destiney Agosto to follow up on PSVT.    Assessment/Recommendations   Assessment:    1.  PSVT, well suppressed on current dose of flecainide and metoprolol.  She reports infrequent episodes of heart racing, typically lasting for 5 beats and resolving.  At this point we will continue on current combination therapy.  2.  Sinus bradycardia, asymptomatic.  Her heart rate has decreased from 57 two years ago to 53.  I suggested we decrease her dose of metoprolol succinate to 25 mg daily.  She will monitor for any increase in her SVT episodes.  3.  Essential hypertension, controlled  4.  Mild hypercholesterolemia, on low-dose statin therapy  5.  Chest pain, likely nonischemic as ECG without acute changes.  No further episodes.  Will monitor going forward.    Plan:  1.  Decrease metoprolol succinate to 25 mg daily.  Continue other medications as is.  2.  Patient to contact me if any increased symptoms of palpitations; otherwise, follow-up in 1 year       History of Present Illness    Ms. Destiney Agosto is a 81 year old female with history of PSVT currently suppressed with combination of flecainide and metoprolol, mild essential hypertension and hypercholesterolemia who presents today for follow-up visit.  Overall, she states she is doing well.  She continues to have rare episodes of palpitations which are typically for 5 beats and resolving.  Denies any prolonged episodes.  No chest tightness with physical activity or shortness of breath.  She did unfortunately sustain a fall while wintering in [place] this past year resulting in a C2 fracture for which she  "underwent surgery.  She reports an episode of burning anterior chest discomfort 2 weeks ago after trying a stationary bike with her arms moved back and forth.  She did not seek medical attention at that time.  Has not had recurrence of symptoms.    ECG (personally reviewed): ECG performed today demonstrates sinus bradycardia rate 53.  Axis and intervals are normal.  No ischemic changes.    Cardiac Imaging Studies (personally reviewed): No cardiac imaging     Physical Examination Review of Systems   /70 (BP Location: Right arm, Patient Position: Sitting, Cuff Size: Adult Regular)   Pulse 56   Resp 16   Ht 1.549 m (5' 1\")   Wt 64.9 kg (143 lb)   BMI 27.02 kg/m    Body mass index is 27.02 kg/m .  Wt Readings from Last 3 Encounters:   09/01/21 64.9 kg (143 lb)   08/04/20 64.9 kg (143 lb)   09/12/19 65.8 kg (145 lb)     General Appearance:   Awake, Alert, No acute distress.   HEENT:  No scleral icterus; the mucous membranes were pink and moist.   Neck: No cervical bruits or jugular venous distention    Chest: The spine was straight. The chest was symmetric.   Lungs:   Respirations unlabored; the lungs are clear to auscultation. No wheezing   Cardiovascular:    Regular rhythm which is mildly bradycardic.  S1, S2 normal.  No murmur or gallop   Abdomen:  No organomegaly, masses, bruits, or tenderness. Bowels sounds are present   Extremities:  No peripheral edema bilaterally.   Skin: No xanthelasma. Warm, Dry.   Musculoskeletal: No tenderness.   Neurologic: Mood and affect are appropriate.    Enc Vitals  BP: 126/70  Pulse: 56  Resp: 16  Weight: 64.9 kg (143 lb) (This mornings at home weight)  Height: 154.9 cm (5' 1\")                                         Medical History  Surgical History Family History Social History   Past Medical History:   Diagnosis Date     Arrhythmia 2007    PVC's with bigeminy, trigeminy     Hyperlipidemia      Hypertension     No past surgical history on file. Family History   Problem " Relation Age of Onset     Cerebrovascular Disease Mother      Aortic aneurysm Father     Social History     Socioeconomic History     Marital status:      Spouse name: Not on file     Number of children: Not on file     Years of education: Not on file     Highest education level: Not on file   Occupational History     Not on file   Tobacco Use     Smoking status: Never Smoker     Smokeless tobacco: Never Used   Substance and Sexual Activity     Alcohol use: Yes     Comment: Alcoholic Drinks/day: occasional     Drug use: Never     Sexual activity: Not on file   Other Topics Concern     Not on file   Social History Narrative     Not on file     Social Determinants of Health     Financial Resource Strain:      Difficulty of Paying Living Expenses:    Food Insecurity:      Worried About Running Out of Food in the Last Year:      Ran Out of Food in the Last Year:    Transportation Needs:      Lack of Transportation (Medical):      Lack of Transportation (Non-Medical):    Physical Activity:      Days of Exercise per Week:      Minutes of Exercise per Session:    Stress:      Feeling of Stress :    Social Connections:      Frequency of Communication with Friends and Family:      Frequency of Social Gatherings with Friends and Family:      Attends Episcopal Services:      Active Member of Clubs or Organizations:      Attends Club or Organization Meetings:      Marital Status:    Intimate Partner Violence:      Fear of Current or Ex-Partner:      Emotionally Abused:      Physically Abused:      Sexually Abused:           Medications  Allergies   Current Outpatient Medications   Medication Sig Dispense Refill     ascorbic acid, vitamin C, (ASCORBIC ACID WITH CARYN HIPS) 500 MG tablet [ASCORBIC ACID, VITAMIN C, (ASCORBIC ACID WITH CARYN HIPS) 500 MG TABLET] Take 500 mg by mouth daily.       aspirin 325 MG EC tablet [ASPIRIN 325 MG EC TABLET] Take 325 mg by mouth daily.       atorvastatin (LIPITOR) 10 MG tablet  [ATORVASTATIN (LIPITOR) 10 MG TABLET] Take 10 mg by mouth at bedtime.       b complex vitamins tablet [B COMPLEX VITAMINS TABLET] Take 1 tablet by mouth daily.       biotin 5,000 mcg TbDL [BIOTIN 5,000 MCG TBDL] Take 1 tablet by mouth daily.       CALCIUM-MAGNESIUM-ZINC ORAL [CALCIUM-MAGNESIUM-ZINC ORAL] Take 1 tablet by mouth 2 (two) times a day.       cholecalciferol, vitamin D3, (VITAMIN D3) 2,000 unit capsule [CHOLECALCIFEROL, VITAMIN D3, (VITAMIN D3) 2,000 UNIT CAPSULE] Take 2,000 Units by mouth 2 (two) times a day.       citalopram (CELEXA) 10 MG tablet [CITALOPRAM (CELEXA) 10 MG TABLET] Take 10 mg by mouth daily.       cranberry fruit extract (CRANBERRY ORAL) [CRANBERRY FRUIT EXTRACT (CRANBERRY ORAL)] Take by mouth daily.       flecainide (TAMBOCOR) 50 MG tablet Take 1 tablet (50 mg) by mouth 2 times daily 180 tablet 0     folic acid/multivit-min/lutein (CENTRUM SILVER ORAL) [FOLIC ACID/MULTIVIT-MIN/LUTEIN (CENTRUM SILVER ORAL)] Take 1 tablet by mouth daily.       garlic 1,000 mg cap [GARLIC 1,000 MG CAP] Take 2 capsules by mouth daily.       ginkgo biloba 40 mg Tab [GINKGO BILOBA 40 MG TAB] Take 1 tablet by mouth daily.       ginseng 100 mg cap [GINSENG 100 MG CAP] Take 1 capsule by mouth daily.       metoprolol succinate (TOPROL-XL) 50 MG 24 hr tablet [METOPROLOL SUCCINATE (TOPROL-XL) 50 MG 24 HR TABLET] Take 1 tablet (50 mg total) by mouth daily. 90 tablet 2     omega 3 1200 MG CAPS        st johns wort 300 MG TABS tablet        Lactobacillus acidophilus (PROBIOTIC ORAL) [LACTOBACILLUS ACIDOPHILUS (PROBIOTIC ORAL)] Take 1 tablet by mouth daily. (Patient not taking: Reported on 9/1/2021)        Allergies   Allergen Reactions     Albuterol      Ibuprofen      Other reaction(s): possible TIA         Lab Results    Chemistry/lipid CBC Cardiac Enzymes/BNP/TSH/INR   Recent Labs   Lab Test 08/05/20  1001   TRIG 194*   LDL 95   BUN 24      CO2 29    No results for input(s): WBC, HGB, HCT, MCV, PLT in the  last 94350 hours. No results for input(s): CKTOTAL, CKMB, TROPONINI, BNP, TSH, INR in the last 81637 hours.    Invalid input(s): CKMBINDEX     A total of 32 minutes was spent reviewing patient's medical records, obtaining history and performing examination, as well as discussing diagnoses/ recommendations with patient and answering all questions.                          Thank you for allowing me to participate in the care of your patient.      Sincerely,     Tianna Kumar MD     [facility]  cc:   Patrick Moreno MD  [place] [phone]

## 2021-09-01 NOTE — PROGRESS NOTES
Thank you, Dr. Patrick Moreno, for asking the [facility] team to see Ms. Destiney Agosto to follow up on PSVT.    Assessment/Recommendations   Assessment:    1.  PSVT, well suppressed on current dose of flecainide and metoprolol.  She reports infrequent episodes of heart racing, typically lasting for 5 beats and resolving.  At this point we will continue on current combination therapy.  2.  Sinus bradycardia, asymptomatic.  Her heart rate has decreased from 57 two years ago to 53.  I suggested we decrease her dose of metoprolol succinate to 25 mg daily.  She will monitor for any increase in her SVT episodes.  3.  Essential hypertension, controlled  4.  Mild hypercholesterolemia, on low-dose statin therapy  5.  Chest pain, likely nonischemic as ECG without acute changes.  No further episodes.  Will monitor going forward.    Plan:  1.  Decrease metoprolol succinate to 25 mg daily.  Continue other medications as is.  2.  Patient to contact me if any increased symptoms of palpitations; otherwise, follow-up in 1 year       History of Present Illness    Ms. Destiney Agosto is a 81 year old female with history of PSVT currently suppressed with combination of flecainide and metoprolol, mild essential hypertension and hypercholesterolemia who presents today for follow-up visit.  Overall, she states she is doing well.  She continues to have rare episodes of palpitations which are typically for 5 beats and resolving.  Denies any prolonged episodes.  No chest tightness with physical activity or shortness of breath.  She did unfortunately sustain a fall while wintering in [place] this past year resulting in a C2 fracture for which she underwent surgery.  She reports an episode of burning anterior chest discomfort 2 weeks ago after trying a stationary bike with her arms moved back and forth.  She did not seek medical attention at that time.  Has not had recurrence of symptoms.    ECG (personally reviewed):  "ECG performed today demonstrates sinus bradycardia rate 53.  Axis and intervals are normal.  No ischemic changes.    Cardiac Imaging Studies (personally reviewed): No cardiac imaging     Physical Examination Review of Systems   /70 (BP Location: Right arm, Patient Position: Sitting, Cuff Size: Adult Regular)   Pulse 56   Resp 16   Ht 1.549 m (5' 1\")   Wt 64.9 kg (143 lb)   BMI 27.02 kg/m    Body mass index is 27.02 kg/m .  Wt Readings from Last 3 Encounters:   09/01/21 64.9 kg (143 lb)   08/04/20 64.9 kg (143 lb)   09/12/19 65.8 kg (145 lb)     General Appearance:   Awake, Alert, No acute distress.   HEENT:  No scleral icterus; the mucous membranes were pink and moist.   Neck: No cervical bruits or jugular venous distention    Chest: The spine was straight. The chest was symmetric.   Lungs:   Respirations unlabored; the lungs are clear to auscultation. No wheezing   Cardiovascular:    Regular rhythm which is mildly bradycardic.  S1, S2 normal.  No murmur or gallop   Abdomen:  No organomegaly, masses, bruits, or tenderness. Bowels sounds are present   Extremities:  No peripheral edema bilaterally.   Skin: No xanthelasma. Warm, Dry.   Musculoskeletal: No tenderness.   Neurologic: Mood and affect are appropriate.    Enc Vitals  BP: 126/70  Pulse: 56  Resp: 16  Weight: 64.9 kg (143 lb) (This mornings at home weight)  Height: 154.9 cm (5' 1\")                                         Medical History  Surgical History Family History Social History   Past Medical History:   Diagnosis Date     Arrhythmia 2007    PVC's with bigeminy, trigeminy     Hyperlipidemia      Hypertension     No past surgical history on file. Family History   Problem Relation Age of Onset     Cerebrovascular Disease Mother      Aortic aneurysm Father     Social History     Socioeconomic History     Marital status:      Spouse name: Not on file     Number of children: Not on file     Years of education: Not on file     Highest " education level: Not on file   Occupational History     Not on file   Tobacco Use     Smoking status: Never Smoker     Smokeless tobacco: Never Used   Substance and Sexual Activity     Alcohol use: Yes     Comment: Alcoholic Drinks/day: occasional     Drug use: Never     Sexual activity: Not on file   Other Topics Concern     Not on file   Social History Narrative     Not on file     Social Determinants of Health     Financial Resource Strain:      Difficulty of Paying Living Expenses:    Food Insecurity:      Worried About Running Out of Food in the Last Year:      Ran Out of Food in the Last Year:    Transportation Needs:      Lack of Transportation (Medical):      Lack of Transportation (Non-Medical):    Physical Activity:      Days of Exercise per Week:      Minutes of Exercise per Session:    Stress:      Feeling of Stress :    Social Connections:      Frequency of Communication with Friends and Family:      Frequency of Social Gatherings with Friends and Family:      Attends Yarsanism Services:      Active Member of Clubs or Organizations:      Attends Club or Organization Meetings:      Marital Status:    Intimate Partner Violence:      Fear of Current or Ex-Partner:      Emotionally Abused:      Physically Abused:      Sexually Abused:           Medications  Allergies   Current Outpatient Medications   Medication Sig Dispense Refill     ascorbic acid, vitamin C, (ASCORBIC ACID WITH CARYN HIPS) 500 MG tablet [ASCORBIC ACID, VITAMIN C, (ASCORBIC ACID WITH CARYN HIPS) 500 MG TABLET] Take 500 mg by mouth daily.       aspirin 325 MG EC tablet [ASPIRIN 325 MG EC TABLET] Take 325 mg by mouth daily.       atorvastatin (LIPITOR) 10 MG tablet [ATORVASTATIN (LIPITOR) 10 MG TABLET] Take 10 mg by mouth at bedtime.       b complex vitamins tablet [B COMPLEX VITAMINS TABLET] Take 1 tablet by mouth daily.       biotin 5,000 mcg TbDL [BIOTIN 5,000 MCG TBDL] Take 1 tablet by mouth daily.       CALCIUM-MAGNESIUM-ZINC ORAL  [CALCIUM-MAGNESIUM-ZINC ORAL] Take 1 tablet by mouth 2 (two) times a day.       cholecalciferol, vitamin D3, (VITAMIN D3) 2,000 unit capsule [CHOLECALCIFEROL, VITAMIN D3, (VITAMIN D3) 2,000 UNIT CAPSULE] Take 2,000 Units by mouth 2 (two) times a day.       citalopram (CELEXA) 10 MG tablet [CITALOPRAM (CELEXA) 10 MG TABLET] Take 10 mg by mouth daily.       cranberry fruit extract (CRANBERRY ORAL) [CRANBERRY FRUIT EXTRACT (CRANBERRY ORAL)] Take by mouth daily.       flecainide (TAMBOCOR) 50 MG tablet Take 1 tablet (50 mg) by mouth 2 times daily 180 tablet 0     folic acid/multivit-min/lutein (CENTRUM SILVER ORAL) [FOLIC ACID/MULTIVIT-MIN/LUTEIN (CENTRUM SILVER ORAL)] Take 1 tablet by mouth daily.       garlic 1,000 mg cap [GARLIC 1,000 MG CAP] Take 2 capsules by mouth daily.       ginkgo biloba 40 mg Tab [GINKGO BILOBA 40 MG TAB] Take 1 tablet by mouth daily.       ginseng 100 mg cap [GINSENG 100 MG CAP] Take 1 capsule by mouth daily.       metoprolol succinate (TOPROL-XL) 50 MG 24 hr tablet [METOPROLOL SUCCINATE (TOPROL-XL) 50 MG 24 HR TABLET] Take 1 tablet (50 mg total) by mouth daily. 90 tablet 2     omega 3 1200 MG CAPS        st johns wort 300 MG TABS tablet        Lactobacillus acidophilus (PROBIOTIC ORAL) [LACTOBACILLUS ACIDOPHILUS (PROBIOTIC ORAL)] Take 1 tablet by mouth daily. (Patient not taking: Reported on 9/1/2021)        Allergies   Allergen Reactions     Albuterol      Ibuprofen      Other reaction(s): possible TIA         Lab Results    Chemistry/lipid CBC Cardiac Enzymes/BNP/TSH/INR   Recent Labs   Lab Test 08/05/20  1001   TRIG 194*   LDL 95   BUN 24      CO2 29    No results for input(s): WBC, HGB, HCT, MCV, PLT in the last 73911 hours. No results for input(s): CKTOTAL, CKMB, TROPONINI, BNP, TSH, INR in the last 28530 hours.    Invalid input(s): CKMBINDEX     A total of 32 minutes was spent reviewing patient's medical records, obtaining history and performing examination, as well as discussing  diagnoses/ recommendations with patient and answering all questions.

## 2021-09-01 NOTE — PATIENT INSTRUCTIONS
1. Decrease metoprolol succinate to 25 mg or half tablet daily. Continue other medications as is.  2. Call if more episodes of fast heart rate.  3. Follow up in 1 year if no further issues

## 2021-09-30 DIAGNOSIS — I47.10 PAROXYSMAL SUPRAVENTRICULAR TACHYCARDIA (H): ICD-10-CM

## 2021-09-30 RX ORDER — FLECAINIDE ACETATE 50 MG/1
50 TABLET ORAL 2 TIMES DAILY
Qty: 180 TABLET | Refills: 2 | Status: SHIPPED | OUTPATIENT
Start: 2021-09-30 | End: 2022-08-19

## 2022-02-14 DIAGNOSIS — I47.10 PAROXYSMAL SUPRAVENTRICULAR TACHYCARDIA (H): Primary | ICD-10-CM

## 2022-02-14 RX ORDER — METOPROLOL SUCCINATE 25 MG/1
25 TABLET, EXTENDED RELEASE ORAL DAILY
Qty: 90 TABLET | Refills: 1 | Status: SHIPPED | OUTPATIENT
Start: 2022-02-14 | End: 2022-08-19

## 2022-02-14 NOTE — TELEPHONE ENCOUNTER
Pt feeling well. HR with lower 59-65 on lower dose of Metoprolol 25 mg since OV 9/1/2021.  No low bp, dizziness or changes in how she feel. Will continue on 25 mg unless called.

## 2022-06-17 ENCOUNTER — NURSE TRIAGE (OUTPATIENT)
Dept: CARDIOLOGY | Facility: CLINIC | Age: 82
End: 2022-06-17
Payer: MEDICARE

## 2022-06-17 NOTE — TELEPHONE ENCOUNTER
"Received a call from the call center to discuss lower heart rate.  Spoke to Destiney who says for the last few months while in [place], she has noticed her heart rate has been on the lower side, and has ongoing fatigue. She says her heart rate has been ranging 57, 58, 64, and occasionally will be in the 70's. She says she checks her radial pulse and manually counts. She says sometimes, she will check her blood pressure and pulse on the machine in the pharmacy also, and blood pressure have been good 130-140s/70s. She says Dr. Kumar had decreased her metoprolol several months back, so is currently taking metoprolol succinate 25 mg daily. She denies feeling dizzy or weak. She is wondering if the metoprolol dose should be decreased further.   Advised a message will be sent to [facility] for follow up.  1. DESCRIPTION: \"Please describe your heart rate or heart beat that you are having\" (e.g., fast/slow, regular/irregular, skipped or extra beats, \"palpitations\") lower than normal  2. ONSET: \"When did it start?\" (Minutes, hours or days) Last few months  3. DURATION: \"How long does it last\" (e.g., seconds, minutes, hours)  4. PATTERN \"Does it come and go, or has it been constant since it started?\" \"Does it get worse with exertion?\" \"Are you feeling it now?\"  5. TAP: \"Using your hand, can you tap out what you are feeling on a chair or table in front of you, so that I can hear?\" (Note: not all patients can do this)   6. HEART RATE: \"Can you tell me your heart rate?\" \"How many beats in 15 seconds?\" (Note: not all patients can do this)   7. RECURRENT SYMPTOM: \"Have you ever had this before?\" If so, ask: \"When was the last time?\" and \"What happened that time?\"   8. CAUSE: \"What do you think is causing the palpitations?\" possible medication  9. CARDIAC HISTORY: \"Do you have any history of heart disease?\" (e.g., heart attack, angina, bypass surgery, angioplasty, arrhythmia) SVT, HTN  10. OTHER SYMPTOMS: \"Do you have any " "other symptoms?\" (e.g., dizziness, chest pain, sweating, difficulty breathing) fatigue      Additional Information    Negative: Heart beating very slowly (e.g., < 50 / minute) (Exception: athlete)    Protocols used: HEART RATE AND HEARTBEAT [id number]      "

## 2022-06-20 ENCOUNTER — TELEPHONE (OUTPATIENT)
Dept: CARDIOLOGY | Facility: CLINIC | Age: 82
End: 2022-06-20
Payer: MEDICARE

## 2022-06-20 NOTE — TELEPHONE ENCOUNTER
[facility] Call Center    Phone Message    May a detailed message be left on voicemail: yes     Reason for Call: Other: Destiney is returning Shonda's call. I tried to reach Shonda in-clinic but was unable. Please give Destiney a call back.     Action Taken: Other: Cardiology    Travel Screening: Not Applicable

## 2022-06-20 NOTE — TELEPHONE ENCOUNTER
Return call to patient after receiving msg from call center that patient had called back but nurse was busy and unable to take return call.    Informed her of Dr. Kumar's response after receiving update from triage nurse - patient verbalized understanding and reported that she does continue to experience fatigue and SOB which could be related to depression and deconditioning - patient agreed to sched follow-up in August and call back during interim if she develops any new or worsening sx - call transf to sched.  mg

## 2022-06-20 NOTE — TELEPHONE ENCOUNTER
[facility] Call Center    Phone Message    May a detailed message be left on voicemail: yes     Reason for Call: Other: Destiney called stating that she has not heard back from her call on friday and she would like call back to discuss. Please advise. Thank you.      Action Taken: Message routed to:  Other: LEOPOLDO    Travel Screening: Not Applicable

## 2022-06-20 NOTE — TELEPHONE ENCOUNTER
I would favor she continues on her current dose of metoprolol until I see her in September, only because the lower we go on the metoprolol, the higher are her chances of going back into SVT.  When I decreased her dose of metoprolol last September, her heart rates were actually in the low 50s.    KML  ---------------------------------------------------------------------------------------------------------------------  From: Amy Michelle RN   Sent: 6/17/2022   3:55 PM CDT   To: Tianna Kumar MD     Please see patient update.   Follow up is planned for September.   Any new orders or recommendations?   Thank you,   Amy

## 2022-07-28 ENCOUNTER — LAB REQUISITION (OUTPATIENT)
Dept: LAB | Facility: CLINIC | Age: 82
End: 2022-07-28
Payer: MEDICARE

## 2022-07-28 DIAGNOSIS — Z01.818 ENCOUNTER FOR OTHER PREPROCEDURAL EXAMINATION: ICD-10-CM

## 2022-07-28 DIAGNOSIS — E78.5 HYPERLIPIDEMIA, UNSPECIFIED: ICD-10-CM

## 2022-07-28 PROCEDURE — 80061 LIPID PANEL: CPT | Mod: ORL | Performed by: PHYSICIAN ASSISTANT

## 2022-07-28 PROCEDURE — 80053 COMPREHEN METABOLIC PANEL: CPT | Mod: ORL | Performed by: PHYSICIAN ASSISTANT

## 2022-07-29 LAB
ALBUMIN SERPL BCG-MCNC: 4.7 G/DL (ref 3.5–5.2)
ALP SERPL-CCNC: 83 U/L (ref 35–104)
ALT SERPL W P-5'-P-CCNC: 19 U/L (ref 10–35)
ANION GAP SERPL CALCULATED.3IONS-SCNC: 8 MMOL/L (ref 7–15)
AST SERPL W P-5'-P-CCNC: 19 U/L (ref 10–35)
BILIRUB SERPL-MCNC: 0.3 MG/DL
BUN SERPL-MCNC: 28.7 MG/DL (ref 8–23)
CALCIUM SERPL-MCNC: 10.6 MG/DL (ref 8.8–10.2)
CHLORIDE SERPL-SCNC: 99 MMOL/L (ref 98–107)
CHOLEST SERPL-MCNC: 190 MG/DL
CREAT SERPL-MCNC: 1.13 MG/DL (ref 0.51–0.95)
DEPRECATED HCO3 PLAS-SCNC: 29 MMOL/L (ref 22–29)
GFR SERPL CREATININE-BSD FRML MDRD: 48 ML/MIN/1.73M2
GLUCOSE SERPL-MCNC: 88 MG/DL (ref 70–99)
HDLC SERPL-MCNC: 71 MG/DL
LDLC SERPL CALC-MCNC: 73 MG/DL
NONHDLC SERPL-MCNC: 119 MG/DL
POTASSIUM SERPL-SCNC: 5.3 MMOL/L (ref 3.4–5.3)
PROT SERPL-MCNC: 6.9 G/DL (ref 6.4–8.3)
SODIUM SERPL-SCNC: 136 MMOL/L (ref 136–145)
TRIGL SERPL-MCNC: 229 MG/DL

## 2022-08-08 ENCOUNTER — LAB REQUISITION (OUTPATIENT)
Dept: LAB | Facility: CLINIC | Age: 82
End: 2022-08-08
Payer: MEDICARE

## 2022-08-08 DIAGNOSIS — Z01.812 ENCOUNTER FOR PREPROCEDURAL LABORATORY EXAMINATION: ICD-10-CM

## 2022-08-08 LAB — SARS-COV-2 RNA RESP QL NAA+PROBE: NEGATIVE

## 2022-08-08 PROCEDURE — U0003 INFECTIOUS AGENT DETECTION BY NUCLEIC ACID (DNA OR RNA); SEVERE ACUTE RESPIRATORY SYNDROME CORONAVIRUS 2 (SARS-COV-2) (CORONAVIRUS DISEASE [COVID-19]), AMPLIFIED PROBE TECHNIQUE, MAKING USE OF HIGH THROUGHPUT TECHNOLOGIES AS DESCRIBED BY CMS-2020-01-R: HCPCS | Mod: ORL | Performed by: PHYSICIAN ASSISTANT

## 2022-08-10 ENCOUNTER — ANESTHESIA EVENT (OUTPATIENT)
Dept: SURGERY | Facility: CLINIC | Age: 82
End: 2022-08-10
Payer: MEDICARE

## 2022-08-11 ENCOUNTER — PREP FOR PROCEDURE (OUTPATIENT)
Dept: SURGERY | Facility: CLINIC | Age: 82
End: 2022-08-11

## 2022-08-11 ENCOUNTER — HOSPITAL ENCOUNTER (OUTPATIENT)
Facility: CLINIC | Age: 82
Discharge: HOME | End: 2022-08-12
Attending: ORTHOPAEDIC SURGERY | Admitting: ORTHOPAEDIC SURGERY
Payer: MEDICARE

## 2022-08-11 ENCOUNTER — ANESTHESIA (OUTPATIENT)
Dept: SURGERY | Facility: CLINIC | Age: 82
End: 2022-08-11
Payer: MEDICARE

## 2022-08-11 DIAGNOSIS — Z96.612 S/P REVERSE TOTAL SHOULDER ARTHROPLASTY, LEFT: Primary | ICD-10-CM

## 2022-08-11 PROCEDURE — 250N000013 HC RX MED GY IP 250 OP 250 PS 637: Performed by: ANESTHESIOLOGY

## 2022-08-11 PROCEDURE — 272N000001 HC OR GENERAL SUPPLY STERILE: Performed by: ORTHOPAEDIC SURGERY

## 2022-08-11 PROCEDURE — C1713 ANCHOR/SCREW BN/BN,TIS/BN: HCPCS | Performed by: ORTHOPAEDIC SURGERY

## 2022-08-11 PROCEDURE — 258N000003 HC RX IP 258 OP 636: Performed by: ANESTHESIOLOGY

## 2022-08-11 PROCEDURE — 250N000011 HC RX IP 250 OP 636: Performed by: NURSE ANESTHETIST, CERTIFIED REGISTERED

## 2022-08-11 PROCEDURE — 250N000009 HC RX 250: Performed by: ANESTHESIOLOGY

## 2022-08-11 PROCEDURE — 120N000001 HC R&B MED SURG/OB

## 2022-08-11 PROCEDURE — 258N000001 HC RX 258: Performed by: ORTHOPAEDIC SURGERY

## 2022-08-11 PROCEDURE — 710N000010 HC RECOVERY PHASE 1, LEVEL 2, PER MIN: Performed by: ORTHOPAEDIC SURGERY

## 2022-08-11 PROCEDURE — 99222 1ST HOSP IP/OBS MODERATE 55: CPT | Performed by: INTERNAL MEDICINE

## 2022-08-11 PROCEDURE — 370N000017 HC ANESTHESIA TECHNICAL FEE, PER MIN: Performed by: ORTHOPAEDIC SURGERY

## 2022-08-11 PROCEDURE — 250N000011 HC RX IP 250 OP 636

## 2022-08-11 PROCEDURE — C1776 JOINT DEVICE (IMPLANTABLE): HCPCS | Performed by: ORTHOPAEDIC SURGERY

## 2022-08-11 PROCEDURE — 250N000013 HC RX MED GY IP 250 OP 250 PS 637: Performed by: INTERNAL MEDICINE

## 2022-08-11 PROCEDURE — 999N000141 HC STATISTIC PRE-PROCEDURE NURSING ASSESSMENT: Performed by: ORTHOPAEDIC SURGERY

## 2022-08-11 PROCEDURE — 250N000009 HC RX 250: Performed by: NURSE ANESTHETIST, CERTIFIED REGISTERED

## 2022-08-11 PROCEDURE — 250N000011 HC RX IP 250 OP 636: Performed by: ANESTHESIOLOGY

## 2022-08-11 PROCEDURE — C9290 INJ, BUPIVACAINE LIPOSOME: HCPCS | Performed by: ANESTHESIOLOGY

## 2022-08-11 PROCEDURE — 250N000025 HC SEVOFLURANE, PER MIN: Performed by: ORTHOPAEDIC SURGERY

## 2022-08-11 PROCEDURE — 360N000077 HC SURGERY LEVEL 4, PER MIN: Performed by: ORTHOPAEDIC SURGERY

## 2022-08-11 PROCEDURE — 258N000003 HC RX IP 258 OP 636

## 2022-08-11 PROCEDURE — 250N000013 HC RX MED GY IP 250 OP 250 PS 637: Performed by: PHYSICIAN ASSISTANT

## 2022-08-11 PROCEDURE — 250N000013 HC RX MED GY IP 250 OP 250 PS 637

## 2022-08-11 PROCEDURE — 258N000003 HC RX IP 258 OP 636: Performed by: NURSE ANESTHETIST, CERTIFIED REGISTERED

## 2022-08-11 DEVICE — IMPLANTABLE DEVICE
Type: IMPLANTABLE DEVICE | Site: SHOULDER | Status: FUNCTIONAL
Brand: AEQUALIS™ FLEX REVIVE™

## 2022-08-11 DEVICE — IMPLANTABLE DEVICE
Type: IMPLANTABLE DEVICE | Site: SHOULDER | Status: FUNCTIONAL
Brand: TORNIER PERFORM® REVERSED GLENOID

## 2022-08-11 DEVICE — IMPLANTABLE DEVICE
Type: IMPLANTABLE DEVICE | Site: SHOULDER | Status: FUNCTIONAL
Brand: TORNIER FLEX SHOULDER SYSTEM

## 2022-08-11 DEVICE — SCREW PERIPHERAL 18MM DWJ318: Type: IMPLANTABLE DEVICE | Site: SHOULDER | Status: FUNCTIONAL

## 2022-08-11 DEVICE — SCREW CENTRAL 6.5X30MM DWJ130: Type: IMPLANTABLE DEVICE | Site: SHOULDER | Status: FUNCTIONAL

## 2022-08-11 DEVICE — TRAY REVERSE HIGH OFFSET +0 DWF520: Type: IMPLANTABLE DEVICE | Site: SHOULDER | Status: FUNCTIONAL

## 2022-08-11 DEVICE — SCREW PERIPHERAL 22MM: Type: IMPLANTABLE DEVICE | Site: SHOULDER | Status: FUNCTIONAL

## 2022-08-11 DEVICE — SCREW PERIPHERAL 5.0X30MM DWJ330: Type: IMPLANTABLE DEVICE | Site: SHOULDER | Status: FUNCTIONAL

## 2022-08-11 RX ORDER — FENTANYL CITRATE 50 UG/ML
50 INJECTION, SOLUTION INTRAMUSCULAR; INTRAVENOUS ONCE
Status: COMPLETED | OUTPATIENT
Start: 2022-08-11 | End: 2022-08-11

## 2022-08-11 RX ORDER — ONDANSETRON 2 MG/ML
4 INJECTION INTRAMUSCULAR; INTRAVENOUS EVERY 30 MIN PRN
Status: DISCONTINUED | OUTPATIENT
Start: 2022-08-11 | End: 2022-08-11 | Stop reason: HOSPADM

## 2022-08-11 RX ORDER — NALOXONE HYDROCHLORIDE 0.4 MG/ML
0.2 INJECTION, SOLUTION INTRAMUSCULAR; INTRAVENOUS; SUBCUTANEOUS
Status: DISCONTINUED | OUTPATIENT
Start: 2022-08-11 | End: 2022-08-12 | Stop reason: HOSPADM

## 2022-08-11 RX ORDER — CEFAZOLIN SODIUM 1 G/3ML
1 INJECTION, POWDER, FOR SOLUTION INTRAMUSCULAR; INTRAVENOUS EVERY 8 HOURS
Status: COMPLETED | OUTPATIENT
Start: 2022-08-11 | End: 2022-08-11

## 2022-08-11 RX ORDER — ONDANSETRON 2 MG/ML
INJECTION INTRAMUSCULAR; INTRAVENOUS PRN
Status: DISCONTINUED | OUTPATIENT
Start: 2022-08-11 | End: 2022-08-11

## 2022-08-11 RX ORDER — LIDOCAINE 40 MG/G
CREAM TOPICAL
Status: DISCONTINUED | OUTPATIENT
Start: 2022-08-11 | End: 2022-08-12 | Stop reason: HOSPADM

## 2022-08-11 RX ORDER — PROPOFOL 10 MG/ML
INJECTION, EMULSION INTRAVENOUS PRN
Status: DISCONTINUED | OUTPATIENT
Start: 2022-08-11 | End: 2022-08-11

## 2022-08-11 RX ORDER — BISACODYL 10 MG
10 SUPPOSITORY, RECTAL RECTAL DAILY PRN
Status: DISCONTINUED | OUTPATIENT
Start: 2022-08-11 | End: 2022-08-12 | Stop reason: HOSPADM

## 2022-08-11 RX ORDER — ACETAMINOPHEN 325 MG/1
975 TABLET ORAL ONCE
Status: COMPLETED | OUTPATIENT
Start: 2022-08-11 | End: 2022-08-11

## 2022-08-11 RX ORDER — PROCHLORPERAZINE MALEATE 5 MG
5 TABLET ORAL EVERY 6 HOURS PRN
Status: DISCONTINUED | OUTPATIENT
Start: 2022-08-11 | End: 2022-08-12 | Stop reason: HOSPADM

## 2022-08-11 RX ORDER — METOPROLOL SUCCINATE 25 MG/1
25 TABLET, EXTENDED RELEASE ORAL DAILY
Status: DISCONTINUED | OUTPATIENT
Start: 2022-08-12 | End: 2022-08-12 | Stop reason: HOSPADM

## 2022-08-11 RX ORDER — NALOXONE HYDROCHLORIDE 0.4 MG/ML
0.4 INJECTION, SOLUTION INTRAMUSCULAR; INTRAVENOUS; SUBCUTANEOUS
Status: DISCONTINUED | OUTPATIENT
Start: 2022-08-11 | End: 2022-08-12 | Stop reason: HOSPADM

## 2022-08-11 RX ORDER — BUPIVACAINE HYDROCHLORIDE 5 MG/ML
INJECTION, SOLUTION EPIDURAL; INTRACAUDAL
Status: COMPLETED | OUTPATIENT
Start: 2022-08-11 | End: 2022-08-11

## 2022-08-11 RX ORDER — SODIUM CHLORIDE, SODIUM LACTATE, POTASSIUM CHLORIDE, CALCIUM CHLORIDE 600; 310; 30; 20 MG/100ML; MG/100ML; MG/100ML; MG/100ML
INJECTION, SOLUTION INTRAVENOUS CONTINUOUS
Status: DISCONTINUED | OUTPATIENT
Start: 2022-08-11 | End: 2022-08-11 | Stop reason: HOSPADM

## 2022-08-11 RX ORDER — VANCOMYCIN HYDROCHLORIDE 1 G/20ML
1 INJECTION, POWDER, LYOPHILIZED, FOR SOLUTION INTRAVENOUS ONCE
Status: DISCONTINUED | OUTPATIENT
Start: 2022-08-11 | End: 2022-08-11

## 2022-08-11 RX ORDER — ACETAMINOPHEN 325 MG/1
975 TABLET ORAL EVERY 8 HOURS
Status: DISCONTINUED | OUTPATIENT
Start: 2022-08-11 | End: 2022-08-12 | Stop reason: HOSPADM

## 2022-08-11 RX ORDER — ACETAMINOPHEN 325 MG/1
650 TABLET ORAL EVERY 4 HOURS PRN
Status: DISCONTINUED | OUTPATIENT
Start: 2022-08-14 | End: 2022-08-12 | Stop reason: HOSPADM

## 2022-08-11 RX ORDER — VANCOMYCIN HYDROCHLORIDE 1 G/20ML
1 INJECTION, POWDER, LYOPHILIZED, FOR SOLUTION INTRAVENOUS ONCE
Status: DISCONTINUED | OUTPATIENT
Start: 2022-08-11 | End: 2022-08-11 | Stop reason: HOSPADM

## 2022-08-11 RX ORDER — HYDROMORPHONE HCL IN WATER/PF 6 MG/30 ML
0.2 PATIENT CONTROLLED ANALGESIA SYRINGE INTRAVENOUS
Status: DISCONTINUED | OUTPATIENT
Start: 2022-08-11 | End: 2022-08-12 | Stop reason: HOSPADM

## 2022-08-11 RX ORDER — TRANEXAMIC ACID 650 MG/1
1950 TABLET ORAL ONCE
Status: COMPLETED | OUTPATIENT
Start: 2022-08-11 | End: 2022-08-11

## 2022-08-11 RX ORDER — CITALOPRAM HYDROBROMIDE 10 MG/1
10 TABLET ORAL DAILY
Status: DISCONTINUED | OUTPATIENT
Start: 2022-08-11 | End: 2022-08-12 | Stop reason: HOSPADM

## 2022-08-11 RX ORDER — FENTANYL CITRATE 50 UG/ML
50 INJECTION, SOLUTION INTRAMUSCULAR; INTRAVENOUS EVERY 5 MIN PRN
Status: DISCONTINUED | OUTPATIENT
Start: 2022-08-11 | End: 2022-08-11 | Stop reason: HOSPADM

## 2022-08-11 RX ORDER — DEXAMETHASONE SODIUM PHOSPHATE 10 MG/ML
INJECTION, SOLUTION INTRAMUSCULAR; INTRAVENOUS PRN
Status: DISCONTINUED | OUTPATIENT
Start: 2022-08-11 | End: 2022-08-11

## 2022-08-11 RX ORDER — POLYETHYLENE GLYCOL 3350 17 G/17G
17 POWDER, FOR SOLUTION ORAL DAILY
Status: DISCONTINUED | OUTPATIENT
Start: 2022-08-12 | End: 2022-08-12 | Stop reason: HOSPADM

## 2022-08-11 RX ORDER — SODIUM CHLORIDE, SODIUM LACTATE, POTASSIUM CHLORIDE, CALCIUM CHLORIDE 600; 310; 30; 20 MG/100ML; MG/100ML; MG/100ML; MG/100ML
INJECTION, SOLUTION INTRAVENOUS CONTINUOUS
Status: DISCONTINUED | OUTPATIENT
Start: 2022-08-11 | End: 2022-08-12 | Stop reason: HOSPADM

## 2022-08-11 RX ORDER — ONDANSETRON 4 MG/1
4 TABLET, ORALLY DISINTEGRATING ORAL EVERY 30 MIN PRN
Status: DISCONTINUED | OUTPATIENT
Start: 2022-08-11 | End: 2022-08-11 | Stop reason: HOSPADM

## 2022-08-11 RX ORDER — VANCOMYCIN HYDROCHLORIDE 1 G/20ML
INJECTION, POWDER, LYOPHILIZED, FOR SOLUTION INTRAVENOUS
Status: DISCONTINUED
Start: 2022-08-11 | End: 2022-08-11 | Stop reason: HOSPADM

## 2022-08-11 RX ORDER — ONDANSETRON 4 MG/1
4 TABLET, ORALLY DISINTEGRATING ORAL EVERY 6 HOURS PRN
Status: DISCONTINUED | OUTPATIENT
Start: 2022-08-11 | End: 2022-08-12 | Stop reason: HOSPADM

## 2022-08-11 RX ORDER — ATORVASTATIN CALCIUM 10 MG/1
10 TABLET, FILM COATED ORAL DAILY
Status: DISCONTINUED | OUTPATIENT
Start: 2022-08-11 | End: 2022-08-12 | Stop reason: HOSPADM

## 2022-08-11 RX ORDER — CEFAZOLIN SODIUM/WATER 2 G/20 ML
2 SYRINGE (ML) INTRAVENOUS
Status: DISCONTINUED | OUTPATIENT
Start: 2022-08-11 | End: 2022-08-11 | Stop reason: HOSPADM

## 2022-08-11 RX ORDER — LIDOCAINE 40 MG/G
CREAM TOPICAL
Status: DISCONTINUED | OUTPATIENT
Start: 2022-08-11 | End: 2022-08-11 | Stop reason: HOSPADM

## 2022-08-11 RX ORDER — FLECAINIDE ACETATE 50 MG/1
50 TABLET ORAL 2 TIMES DAILY
Status: DISCONTINUED | OUTPATIENT
Start: 2022-08-11 | End: 2022-08-12 | Stop reason: HOSPADM

## 2022-08-11 RX ORDER — HYDROMORPHONE HCL IN WATER/PF 6 MG/30 ML
0.2 PATIENT CONTROLLED ANALGESIA SYRINGE INTRAVENOUS EVERY 5 MIN PRN
Status: DISCONTINUED | OUTPATIENT
Start: 2022-08-11 | End: 2022-08-11 | Stop reason: HOSPADM

## 2022-08-11 RX ORDER — ONDANSETRON 2 MG/ML
4 INJECTION INTRAMUSCULAR; INTRAVENOUS EVERY 6 HOURS PRN
Status: DISCONTINUED | OUTPATIENT
Start: 2022-08-11 | End: 2022-08-12 | Stop reason: HOSPADM

## 2022-08-11 RX ORDER — OXYCODONE HYDROCHLORIDE 5 MG/1
5 TABLET ORAL EVERY 4 HOURS PRN
Status: DISCONTINUED | OUTPATIENT
Start: 2022-08-11 | End: 2022-08-11 | Stop reason: HOSPADM

## 2022-08-11 RX ORDER — OXYCODONE HYDROCHLORIDE 5 MG/1
5 TABLET ORAL EVERY 4 HOURS PRN
Status: DISCONTINUED | OUTPATIENT
Start: 2022-08-11 | End: 2022-08-12 | Stop reason: HOSPADM

## 2022-08-11 RX ORDER — AMOXICILLIN 250 MG
1 CAPSULE ORAL 2 TIMES DAILY
Status: DISCONTINUED | OUTPATIENT
Start: 2022-08-11 | End: 2022-08-12 | Stop reason: HOSPADM

## 2022-08-11 RX ORDER — CEFAZOLIN SODIUM/WATER 2 G/20 ML
2 SYRINGE (ML) INTRAVENOUS SEE ADMIN INSTRUCTIONS
Status: DISCONTINUED | OUTPATIENT
Start: 2022-08-11 | End: 2022-08-11 | Stop reason: HOSPADM

## 2022-08-11 RX ADMIN — BUPIVACAINE HYDROCHLORIDE 10 ML: 5 INJECTION, SOLUTION EPIDURAL; INTRACAUDAL at 06:56

## 2022-08-11 RX ADMIN — CEFAZOLIN 1 G: 1 INJECTION, POWDER, FOR SOLUTION INTRAMUSCULAR; INTRAVENOUS at 14:40

## 2022-08-11 RX ADMIN — SUGAMMADEX 200 MG: 100 INJECTION, SOLUTION INTRAVENOUS at 09:27

## 2022-08-11 RX ADMIN — SODIUM CHLORIDE, POTASSIUM CHLORIDE, SODIUM LACTATE AND CALCIUM CHLORIDE: 600; 310; 30; 20 INJECTION, SOLUTION INTRAVENOUS at 18:51

## 2022-08-11 RX ADMIN — ASPIRIN 325 MG: 325 TABLET ORAL at 14:41

## 2022-08-11 RX ADMIN — CEFAZOLIN 1 G: 1 INJECTION, POWDER, FOR SOLUTION INTRAMUSCULAR; INTRAVENOUS at 21:45

## 2022-08-11 RX ADMIN — SENNOSIDES AND DOCUSATE SODIUM 1 TABLET: 50; 8.6 TABLET ORAL at 21:44

## 2022-08-11 RX ADMIN — MIDAZOLAM HYDROCHLORIDE 0.5 MG: 1 INJECTION, SOLUTION INTRAMUSCULAR; INTRAVENOUS at 07:03

## 2022-08-11 RX ADMIN — BUPIVACAINE HYDROCHLORIDE 5 ML: 5 INJECTION, SOLUTION EPIDURAL; INTRACAUDAL; PERINEURAL at 06:55

## 2022-08-11 RX ADMIN — BUPIVACAINE 10 ML: 13.3 INJECTION, SUSPENSION, LIPOSOMAL INFILTRATION at 06:56

## 2022-08-11 RX ADMIN — ACETAMINOPHEN 975 MG: 325 TABLET ORAL at 21:44

## 2022-08-11 RX ADMIN — ACETAMINOPHEN 975 MG: 325 TABLET ORAL at 14:40

## 2022-08-11 RX ADMIN — TRANEXAMIC ACID 1950 MG: 650 TABLET ORAL at 06:06

## 2022-08-11 RX ADMIN — ATORVASTATIN CALCIUM 10 MG: 10 TABLET, FILM COATED ORAL at 14:41

## 2022-08-11 RX ADMIN — DEXAMETHASONE SODIUM PHOSPHATE 10 MG: 10 INJECTION, SOLUTION INTRAMUSCULAR; INTRAVENOUS at 08:08

## 2022-08-11 RX ADMIN — PROPOFOL 100 MG: 10 INJECTION, EMULSION INTRAVENOUS at 07:28

## 2022-08-11 RX ADMIN — SODIUM CHLORIDE, POTASSIUM CHLORIDE, SODIUM LACTATE AND CALCIUM CHLORIDE: 600; 310; 30; 20 INJECTION, SOLUTION INTRAVENOUS at 07:58

## 2022-08-11 RX ADMIN — FENTANYL CITRATE 50 MCG: 50 INJECTION, SOLUTION INTRAMUSCULAR; INTRAVENOUS at 07:04

## 2022-08-11 RX ADMIN — CITALOPRAM HYDROBROMIDE 10 MG: 10 TABLET ORAL at 14:41

## 2022-08-11 RX ADMIN — PHENYLEPHRINE HYDROCHLORIDE 0.2 MCG/KG/MIN: 10 INJECTION INTRAVENOUS at 08:19

## 2022-08-11 RX ADMIN — ONDANSETRON 4 MG: 2 INJECTION INTRAMUSCULAR; INTRAVENOUS at 07:19

## 2022-08-11 RX ADMIN — ROCURONIUM BROMIDE 40 MG: 10 INJECTION, SOLUTION INTRAVENOUS at 07:28

## 2022-08-11 RX ADMIN — ACETAMINOPHEN 975 MG: 325 TABLET ORAL at 06:06

## 2022-08-11 RX ADMIN — SODIUM CHLORIDE, POTASSIUM CHLORIDE, SODIUM LACTATE AND CALCIUM CHLORIDE: 600; 310; 30; 20 INJECTION, SOLUTION INTRAVENOUS at 06:30

## 2022-08-11 RX ADMIN — FLECAINIDE ACETATE 50 MG: 50 TABLET ORAL at 21:44

## 2022-08-11 ASSESSMENT — ACTIVITIES OF DAILY LIVING (ADL)
ADLS_ACUITY_SCORE: 36
ADLS_ACUITY_SCORE: 35
ADLS_ACUITY_SCORE: 37
ADLS_ACUITY_SCORE: 35
ADLS_ACUITY_SCORE: 35
ADLS_ACUITY_SCORE: 37
ADLS_ACUITY_SCORE: 36
ADLS_ACUITY_SCORE: 36
ADLS_ACUITY_SCORE: 35

## 2022-08-11 ASSESSMENT — ENCOUNTER SYMPTOMS: DYSRHYTHMIAS: 1

## 2022-08-11 NOTE — PHARMACY-ADMISSION MEDICATION HISTORY
-Please read discharge instructions in your AVS for additional information and instructions.     -Take medications as prescribed  -OTC medications as directed/discussed: Tylenol/ibuprofen for fever, body aches, or inflammation.  Please follow dosage directions on packaging.  -Ensure adequate fluid, nutrition and rest.  -Consider a COOL MIST HUMIDIFIER in bedroom and sleep with the door closed.   -Avoid exposure to cigarette smoke  -Sleep with head elevated to help sinuses drain.  -Ensure proper hand washing and covering when coughing.  -Breathe deeply when showering to help promote drainage.  -Warm compresses to sinuses to help promote drainage.    I have formulated a discharge action plan for you:     -Follow-up with primary care doctor/specialist in the next couple days for evaluation/reevaluation from ICC visit. I have carefully and repeatedly discussed with you the need to follow up with a primary care provider and/or specialist as necessary; I have gave you pertinent information concerning follow up with PCP/specialist verbally and/or in your after visit summary.    -Take prescribed medication as directed; You were instructed verbally and/or in discharge after visit summary on use of any otc/prescription medications involved in your discharge care.  -I have carefully and comprehensively answered your questions and addressed any concerns that you had regarding your medical illness today and your discharge care and follow-up.  -Go to ER for any new or worsening symptoms; If you are experiencing any type of medical emergency; You should call 911 for help.  -You felt comfortable going home at this time, you understand the discharge action plan, follow up with PCP/specialist as discussed above, and understand clearly when/if to go to ER.     To establish care with an [facility] Provider, please call [phone] ([phone]).    Thank you for visiting [facility].      Patient Education  Pharmacy Note - Admission Medication History    Pertinent Provider Information: n/a   ______________________________________________________________________    Prior To Admission (PTA) med list completed and updated in EMR.       PTA Med List   Medication Sig Last Dose     ascorbic acid, vitamin C, (ASCORBIC ACID WITH CARYN HIPS) 500 MG tablet [ASCORBIC ACID, VITAMIN C, (ASCORBIC ACID WITH CARYN HIPS) 500 MG TABLET] Take 500 mg by mouth daily. 8/10/2022 at am     aspirin 325 MG EC tablet [ASPIRIN 325 MG EC TABLET] Take 325 mg by mouth daily. 8/5/2022     atorvastatin (LIPITOR) 10 MG tablet Take 10 mg by mouth daily 8/10/2022 at am     biotin 5,000 mcg TbDL [BIOTIN 5,000 MCG TBDL] Take 1 tablet by mouth daily. 8/10/2022 at am     CALCIUM-MAGNESIUM-ZINC ORAL [CALCIUM-MAGNESIUM-ZINC ORAL] Take 1 tablet by mouth 2 (two) times a day. 8/10/2022 at hs     cholecalciferol, vitamin D3, (VITAMIN D3) 2,000 unit capsule Take 2,000 Units by mouth daily 8/10/2022 at am     citalopram (CELEXA) 10 MG tablet [CITALOPRAM (CELEXA) 10 MG TABLET] Take 10 mg by mouth daily. 8/10/2022 at am     flecainide (TAMBOCOR) 50 MG tablet Take 1 tablet (50 mg) by mouth 2 times daily 8/11/2022 at 0430     folic acid/multivit-min/lutein (CENTRUM SILVER ORAL) [FOLIC ACID/MULTIVIT-MIN/LUTEIN (CENTRUM SILVER ORAL)] Take 1 tablet by mouth daily. 8/10/2022 at am     garlic 1,000 mg cap Take 1 capsule by mouth daily 8/10/2022 at am     metoprolol succinate ER (TOPROL-XL) 25 MG 24 hr tablet Take 1 tablet (25 mg) by mouth daily 8/11/2022 at 0430     omega 3 1200 MG CAPS Take 1 capsule by mouth daily 8/10/2022 at am     Information source(s): Patient    Method of interview communication: in-person    Patient was asked about OTC/herbal products specifically.  PTA med list reflects this.    Based on the pharmacist's assessment, the PTA med list information appears reliable    Allergies were reviewed, assessed, and updated with the patient.      Patient does not      Influenza (Child)    Influenza is also called the flu. It is a viral illness that affects the air passages of your lungs. It is different from the common cold. The flu can easily be passed from one to person to another. It may be spread through the air by coughing and sneezing. Or it can be spread by touching the sick person and then touching your own eyes, nose, or mouth.  Symptoms of the flu may be mild or severe. They can include extreme tiredness (wanting to stay in bed all day), chills, fevers, muscle aches, soreness with eye movement, headache, and a dry, hacking cough.  Your child usually won’t need to take antibiotics, unless he or she has a complication. This might be an ear or sinus infection or pneumonia.  Home care  Follow these guidelines when caring for your child at home:  · Fluids. Fever increases the amount of water your child loses from his or her body. For babies younger than 1 year old, keep giving regular feedings (formula or breast). Talk with your child’s healthcare provider to find out how much fluid your baby should be getting. If needed, give an oral rehydration solution. You can buy this at the grocery or pharmacy without a prescription. For a child older than 1 year, give him or her more fluids and continue his or her normal diet. If your child is dehydrated, give an oral rehydration solution. Go back to your child’s normal diet as soon as possible. If your child has diarrhea, don’t give juice, flavored gelatin water, soft drinks without caffeine, lemonade, fruit drinks, or popsicles. This may make diarrhea worse.  · Food. If your child doesn’t want to eat solid foods, it’s OK for a few days. Make sure your child drinks lots of fluid and has a normal amount of urine.  · Activity. Keep children with fever at home resting or playing quietly. Encourage your child to take naps. Your child may go back to  or school when the fever is gone for at least 24 hours. The fever should be  use any multi-dose medications prior to admission.     Thank you for the opportunity to participate in the care of this patient.    Amy Abbasi, PharmD, BCPS     8/11/2022     6:32 AM   gone without giving your child acetaminophen or other medicine to reduce fever. Your child should also be eating well and feeling better.  · Sleep. It’s normal for your child to be unable to sleep or be irritable if he or she has the flu. A child who has congestion will sleep best with his or her head and upper body raised up. Or you can raise the head of the bed frame on a 6-inch block.  · Cough. Coughing is a normal part of the flu. You can use a cool mist humidifier at the bedside. Don’t give over-the-counter cough and cold medicines to children younger than 6 years of age, unless the healthcare provider tells you to do so. These medicines don’t help ease symptoms. And they can cause serious side effects, especially in babies younger than 2 years of age. Don’t allow anyone to smoke around your child. Smoke can make the cough worse.  · Nasal congestion. Use a rubber bulb syringe to suction the nose of a baby. You may put 2 to 3 drops of saltwater (saline) nose drops in each nostril before suctioning. This will help remove secretions. You can buy saline nose drops without a prescription. You can make the drops yourself by adding 1/4 teaspoon table salt to 1 cup of water.  · Fever. Use acetaminophen to control pain, unless another medicine was prescribed. In infants older than 6 months of age, you may use ibuprofen instead of acetaminophen. If your child has chronic liver or kidney disease, talk with your child’s provider before using these medicines. Also talk with the provider if your child has ever had a stomach ulcer or GI (gastrointestinal) bleeding. Don’t give aspirin to anyone younger than 18 years old who is ill with a fever. It may cause severe liver damage.  Follow-up care  Follow up with your child’s healthcare provider, or as advised.  When to seek medical advice  Call your child’s healthcare provider right away if any of these occur:  · Your child has a fever, as directed by the healthcare provider,  or:  ? Your child is younger than 12 weeks old and has a fever of 100.4°F (38°C) or higher. Your baby may need to be seen by a healthcare provider.  ? Your child has repeated fevers above 104°F (40°C) at any age.  ? Your child is younger than 2 years old and his or her fever continues for more than 24 hours.  ? Your child is 2 years old or older and his or her fever continues for more than 3 days.  · Fast breathing. In a child age 6 weeks to 2 years, this is more than 45 breaths per minute. In a child 3 to 6 years, this is more than 35 breaths per minute. In a child 7 to 10 years, this is more than 30 breaths per minute. In a child older than 10 years, this is more than 25 breaths per minute.  · Earache, sinus pain, stiff or painful neck, headache, or repeated diarrhea or vomiting  · Unusual fussiness, drowsiness, or confusion  · Your child doesn’t interact with you as he or she normally does  · Your child doesn’t want to be held  · Your child is not drinking enough fluid. This may show as no tears when crying, or \"sunken\" eyes or dry mouth. It may also be no wet diapers for 8 hours in a baby. Or it may be less urine than usual in older children.  · Rash with fever  Date Last Reviewed: 1/1/2017  © 3735-1297 [employer]. [address] [place] [address], [place] [address]. All rights reserved. This information is not intended as a substitute for professional medical care. Always follow your healthcare professional's instructions.

## 2022-08-11 NOTE — ANESTHESIA PROCEDURE NOTES
Brachial plexus Procedure Note    Pre-Procedure   Staff -        Anesthesiologist:  Nicki Cox MD       Performed By: anesthesiologist       Location: pre-op       Procedure Start/Stop Times: 8/11/2022 6:56 AM and 8/11/2022 6:58 AM       Pre-Anesthestic Checklist: patient identified, IV checked, site marked, risks and benefits discussed, informed consent, monitors and equipment checked, pre-op evaluation, at physician/surgeon's request and post-op pain management  Timeout:       Correct Patient: Yes        Correct Procedure: Yes        Correct Site: Yes        Correct Position: Yes        Correct Laterality: Yes        Site Marked: Yes  Procedure Documentation  Procedure: Brachial plexus       Laterality: left       Patient Position: sitting       Patient Prep/Sterile Barriers: sterile gloves, mask       Skin prep: Chloraprep (interscalene approach).       Needle Type: [employer] needle       Needle Gauge: 21.        Needle Length (Inches): 4        1. Ultrasound was used to identify targeted nerve, plexus, vascular marker, or fascial plane and place a needle adjacent to it in real-time.       2. Ultrasound was used to visualize the spread of anesthetic in close proximity to the above referenced structure.       3. A permanent image is entered into the patient's record.       4. The visualized anatomic structures appeared normal.       5. There were no apparent abnormal pathologic findings.    Assessment/Narrative         The placement was negative for: blood aspirated, painful injection and site bleeding       Paresthesias: No.       Bolus given via needle..        Secured via.        Injection made incrementally with aspirations every 5 mL.    Medication(s) Administered   Bupivacaine 0.5% PF (Infiltration) - Infiltration   10 mL - 8/11/2022 6:56:00 AM  Bupivacaine liposome (Exparel) 1.3% LA inj susp (Infiltration) - Infiltration   10 mL - 8/11/2022 6:56:00 AM  Medication Administration Time: 8/11/2022  6:56 AM

## 2022-08-11 NOTE — ANESTHESIA PROCEDURE NOTES
Airway       Patient location during procedure: OR       Procedure Start/Stop Times: 8/11/2022 7:31 AM  Staff -        Performed By: CRNA  Consent for Airway        Urgency: elective  Indications and Patient Condition       Indications for airway management: da-procedural and airway protection       Induction type:intravenous       Mask difficulty assessment: 1 - vent by mask    Final Airway Details       Final airway type: endotracheal airway       Successful airway: ETT - single  Endotracheal Airway Details        ETT size (mm): 7.0       Cuffed: yes       Successful intubation technique: video laryngoscopy       VL Blade Size: Glidescope 3       Grade View of Cords: 1       Position: Right       Secured at (cm): 22    Post intubation assessment        Placement verified by: capnometry, equal breath sounds and chest rise        Number of attempts at approach: 1       Ease of procedure: easy       Dentition: Intact and Unchanged    Medication(s) Administered   Medication Administration Time: 8/11/2022 7:31 AM

## 2022-08-11 NOTE — ANESTHESIA POSTPROCEDURE EVALUATION
Patient: Destiney Agosto    Procedure: Procedure(s):  REVERSE TOTAL SHOULDER ARTHROPLASTY       Anesthesia Type:  General    Note:  Disposition: Outpatient   Postop Pain Control: Uneventful            Sign Out: Well controlled pain   PONV: No   Neuro/Psych: Uneventful            Sign Out: Acceptable/Baseline neuro status   Airway/Respiratory: Uneventful            Sign Out: Acceptable/Baseline resp. status   CV/Hemodynamics: Uneventful            Sign Out: Acceptable CV status; No obvious hypovolemia; No obvious fluid overload   Other NRE: NONE   DID A NON-ROUTINE EVENT OCCUR?            Last vitals:  Vitals Value Taken Time   /74 08/11/22 0951   Temp 36.4  C (97.5  F) 08/11/22 0933   Pulse 58 08/11/22 0957   Resp 17 08/11/22 0957   SpO2 95 % 08/11/22 0957   Vitals shown include unvalidated device data.    Electronically Signed By: Nicki Cox MD  August 11, 2022  9:59 AM

## 2022-08-11 NOTE — ANESTHESIA PROCEDURE NOTES
Cervical Plexus (superficial) Procedure Note    Pre-Procedure   Staff -        Anesthesiologist:  Nicki Cox MD       Performed By: anesthesiologist       Location: pre-op       Procedure Start/Stop Times: 8/11/2022 6:55 AM and 8/11/2022 6:56 AM       Pre-Anesthestic Checklist: patient identified, IV checked, site marked, risks and benefits discussed, informed consent, monitors and equipment checked, pre-op evaluation, at physician/surgeon's request and post-op pain management  Timeout:       Correct Patient: Yes        Correct Procedure: Yes        Correct Site: Yes        Correct Position: Yes        Correct Laterality: Yes        Site Marked: Yes  Procedure Documentation  Procedure: Cervical Plexus (superficial)       Laterality: left       Patient Position: sitting       Patient Prep/Sterile Barriers: sterile gloves, mask       Skin prep: Chloraprep       Needle Type: [employer] needle       Needle Gauge: 21.        Needle Length (Inches): 4        1. Ultrasound was used to identify targeted nerve, plexus, vascular marker, or fascial plane and place a needle adjacent to it in real-time.       2. Ultrasound was used to visualize the spread of anesthetic in close proximity to the above referenced structure.       3. A permanent image is entered into the patient's record.       4. The visualized anatomic structures appeared normal.       5. There were no apparent abnormal pathologic findings.    Assessment/Narrative         The placement was negative for: blood aspirated, painful injection and site bleeding       Paresthesias: No.       Bolus given via needle..        Secured via.     Medication(s) Administered   Bupivacaine 0.5% PF (Infiltration) - Infiltration   5 mL - 8/11/2022 6:55:00 AM  Medication Administration Time: 8/11/2022 6:55 AM

## 2022-08-11 NOTE — CONSULTS
INTERNAL MEDICINE CONSULT    Physician requesting consult: Dr. Gallegos  Reason for consult: Hypertension    Assessment and Plan:  Hypertension:  Blood pressure is stable  Continue home Toprol-XL    History of SVT  Continue home Toprol-XL, flecainide    Dyslipidemia  Continue statin    History of depression  Continue home medications    H/O Provoked DVT  It was after surgery 8 years ago  She was treated with Xarelto  DVT prophylaxis defer to orthopedics.    Left shoulder rotator cuff arthropathy status post reverse total shoulder arthroplasty: Postop day #0.  Continue PT OT, pain control, DVT prophylaxis per orthopedic surgery.  Encourage incentive spirometry, monitor hemoglobin    HPI:     Destiney Agosto is a 82 year old old female with past history significant for hypertension, SVT, depression, dyslipidemia DVT 8 years ago following a surgery admitted postoperatively after she underwent a left reverse total shoulder arthroplasty.  Tolerated procedure well.  Estimated blood [id number] mL. Currently denies any complaints such as nausea vomiting shortness of breath chest pain abdominal pain. Preop history and physical reviewed.     Medical History  Past Medical History:   Diagnosis Date     Arrhythmia 01/01/2007    PVC's with bigeminy, trigeminy     Arthritis      Heart murmur      Hyperlipidemia      Hypertension      Irregular heart beat      Motion sickness      Thrombosis      Uncomplicated asthma       Patient Active Problem List    Diagnosis Date Noted     S/P reverse total shoulder arthroplasty, left 08/11/2022     Priority: Medium     SVT (supraventricular tachycardia) (H) 09/12/2019     Priority: Medium     Dx CATHY monitor.   On flecainide         Essential hypertension 09/12/2019     Priority: Medium     History of deep venous thrombosis 09/12/2019     Priority: Medium     Post surgical             Surgical History  She  has no past surgical history on file.   History reviewed. No pertinent surgical  history.    Allergies  Allergies   Allergen Reactions     Albuterol      Ibuprofen      Other reaction(s): possible TGA total global amnesia        Prior to Admission Medications   Medications Prior to Admission   Medication Sig Dispense Refill Last Dose     ascorbic acid, vitamin C, (ASCORBIC ACID WITH CARYN HIPS) 500 MG tablet [ASCORBIC ACID, VITAMIN C, (ASCORBIC ACID WITH CARYN HIPS) 500 MG TABLET] Take 500 mg by mouth daily.   8/10/2022 at am     aspirin 325 MG EC tablet [ASPIRIN 325 MG EC TABLET] Take 325 mg by mouth daily.   8/5/2022     atorvastatin (LIPITOR) 10 MG tablet Take 10 mg by mouth daily   8/10/2022 at am     biotin 5,000 mcg TbDL [BIOTIN 5,000 MCG TBDL] Take 1 tablet by mouth daily.   8/10/2022 at am     CALCIUM-MAGNESIUM-ZINC ORAL [CALCIUM-MAGNESIUM-ZINC ORAL] Take 1 tablet by mouth 2 (two) times a day.   8/10/2022 at hs     cholecalciferol, vitamin D3, (VITAMIN D3) 2,000 unit capsule Take 2,000 Units by mouth daily   8/10/2022 at am     citalopram (CELEXA) 10 MG tablet [CITALOPRAM (CELEXA) 10 MG TABLET] Take 10 mg by mouth daily.   8/10/2022 at am     flecainide (TAMBOCOR) 50 MG tablet Take 1 tablet (50 mg) by mouth 2 times daily 180 tablet 2 8/11/2022 at 0430     folic acid/multivit-min/lutein (CENTRUM SILVER ORAL) [FOLIC ACID/MULTIVIT-MIN/LUTEIN (CENTRUM SILVER ORAL)] Take 1 tablet by mouth daily.   8/10/2022 at am     garlic 1,000 mg cap Take 1 capsule by mouth daily   8/10/2022 at am     metoprolol succinate ER (TOPROL-XL) 25 MG 24 hr tablet Take 1 tablet (25 mg) by mouth daily 90 tablet 1 8/11/2022 at 0430     omega 3 1200 MG CAPS Take 1 capsule by mouth daily   8/10/2022 at am       Social History  Reviewed, and she  reports that she has never smoked. She has never used smokeless tobacco. She reports current alcohol use. She reports that she does not use drugs.  Social History     Tobacco Use     Smoking status: Never Smoker     Smokeless tobacco: Never Used   Substance Use Topics     Alcohol  "use: Yes     Comment: 4-5 a week       Family History  Reviewed, and father with history of AAA, mother with history of CVA, hypertension    Review Of Systems  As per admission HPI, all others reviewed and negative.     Physical Exam:  /62   Pulse 57   Temp 97.3  F (36.3  C) (Temporal)   Resp 14   Ht 1.549 m (5' 1\")   Wt 67.1 kg (148 lb)   SpO2 97%   BMI 27.96 kg/m    General Appearance:  Awake Alert, orientedx3, not in any apparent distress   Head:  Normocephalic, without obvious abnormality   Eyes:  PERRL, conjunctiva/corneas clear   Throat: Oral mucosa moist   Neck: Supple,  no JVD   Lungs:   Clear to auscultation bilaterally, respirations unlabored   Chest Wall:  No tenderness   Heart:  Regular rate and rhythm, S1, S2 normal,no murmur   Abdomen:   Soft, non-tender, bowel sounds present,  no masses, no organomegaly   Extremities:  Status post left reverse total shoulder arthroplasty   Skin: Skin color, texture, turgor normal, no rashes or lesions   Neurologic: Alert and oriented X 3, exam on operated extremity defer to surgery     Results:  Results for orders placed or performed during the hospital encounter of 08/11/22   POC US Guidance Needle Placement     Status: None    Narrative    Ultrasound was performed as guidance to an anesthesia procedure.  Click   \"PACS images\" hyperlink below to view any stored images.  For specific   procedure details, view procedure note authored by anesthesia.       Imaging:   POC US Guidance Needle Placement    Result Date: 8/11/2022  Ultrasound was performed as guidance to an anesthesia procedure.  Click \"PACS images\" hyperlink below to view any stored images.  For specific procedure details, view procedure note authored by anesthesia.    Radha Calle M.D  [facility]  Internal Medicine    8/11/2022  12:14 PM        "

## 2022-08-11 NOTE — INTERVAL H&P NOTE
"I have reviewed the surgical (or preoperative) H&P that is linked to this encounter, and examined the patient. There are no significant changes    Clinical Conditions Present on Arrival:  Clinically Significant Risk Factors Present on Admission            # Hypercalcemia: Ca = N/A and/or iCa = N/A on admission, will monitor as appropriate        # Overweight: Estimated body mass index is 27.96 kg/m  as calculated from the following:    Height as of this encounter: 1.549 m (5' 1\").    Weight as of this encounter: 67.1 kg (148 lb).       "

## 2022-08-11 NOTE — ANESTHESIA CARE TRANSFER NOTE
Patient: Destiney Agosto    Procedure: Procedure(s):  REVERSE TOTAL SHOULDER ARTHROPLASTY       Diagnosis: Shoulder pain, left [M25.512]  Diagnosis Additional Information: No value filed.    Anesthesia Type:   General     Note:    Oropharynx: oropharynx clear of all foreign objects and spontaneously breathing  Level of Consciousness: drowsy  Oxygen Supplementation: face mask  Level of Supplemental Oxygen (L/min / FiO2): 8  Independent Airway: airway patency satisfactory and stable  Dentition: dentition unchanged  Vital Signs Stable: post-procedure vital signs reviewed and stable  Report to RN Given: handoff report given  Patient transferred to: PACU    Handoff Report: Reviewed Intra-OP anesthesia mangement and issues during anesthesia      Vitals:  Vitals Value Taken Time   /85 08/11/22 0937   Temp 36.4  C (97.5  F) 08/11/22 0933   Pulse 61 08/11/22 0938   Resp 19 08/11/22 0938   SpO2 99 % 08/11/22 0938   Vitals shown include unvalidated device data.    Electronically Signed By: ARMANDO Lopez CRNA  August 11, 2022  9:40 AM

## 2022-08-11 NOTE — OP NOTE
Operative Note    Name:  Destiney Agosto  :  1940  MRN:  [id number]  Procedure Date:  2022      Preoperative Diagnosis:  Left shoulder rotator cuff arthropathy    Postoperative Diagnosis:  SAME    Procedures:  REVERSE TOTAL SHOULDER ARTHROPLASTY    Prosthetic Devices:      Surgeon(s) and Assistants (if any):    Surgeon(s):   Jn Gallegos MD    Asst.   Circulator: Annelise Wiley RN  Relief Scrub: Chyna Hill  Scrub Person: Marianna Greene  Orthopedic Assistant: Kian Patel  Staff Assist: Gila Stafford RN    Anesthesia:  General with Block    Drains:  None    Specimens: None    Tissue Removed, Not Sent: Humeral head    Complications:  NONE    Findings/Conclusions: Advanced grade 4 rotator cuff arthropathy    Estimated Blood Loss:  100 mL    Condition on discharge from OR:  Satisfactory    IMPLANTS: Tornier size 11 stem with a 11 x 30 spacer and 11 proximal body, +0 high offset tray, 36+6 poly-, 25 standard baseplate, and a 36 glenosphere    INDICATION FOR OPERATION:  Destiney Agosto is a 82 year old female with a history of shoulder pain   and stiffness and she has failed nonsurgical treatment.  Imaging was consistent with grade 4 rotator cuff arthropathy and I recommend she underwent reverse total shoulder arthroplasty. Risks and benefits of the planned procedure as well as details of surgery were discussed with the patient. Consent was obtained.       REPORT OF OPERATION:   The patient was brought to operating room and placed supine on the operating table. An interscalene block was placed by Anesthesia preoperatively and she was given 2 gram of Ancef preoperatively. After induction of general anesthesia, she was placed in the beach-chair position on the operating room table. All bony prominences were well padded. The shoulder was prepped and draped in normal sterile fashion.    A standard anterior deltopectoral incision was utilized. Deep retractors were placed below the  clavipectoral fascia and the deltoid. The humeral head was inspected.  She was noted to have complete tear of the vast majority the rotator cuff.    The humeral head was exposed and noted to have severe wear pattern of the anterior and mid humeral head with complete eburnation. Humeral head cut was then performed using appropriate instrumentation. The humerus was then reamed and broached, and a trial stem with a head protection plate was placed.   The glenoid was then exposed. The labrum was excised circumferentially.  Inferior capsule release was performed, taking care to identify and protect the axillary nerve.    Next, the glenoid was drilled and reamed and prepared for glenoid implant. The glenoid baseplate was then impacted and the central screw and 3 peripheral screws were placed. A 36 standard glenosphere was placed. The humerus was again exposed. The humeral component was trialed, and at this time I did note that there was a fracture along the medial calcar and then elected to switch to a distal fixation stem.  I then reamed up to a size 11 stem and then placed the appropriate length trial stem into place.  Then trialed a high offset tray with 6 mm poly and found the shoulder to be stable with appropriate tension.  Femoral trial implants were then removed and humerus was then copiously irrigated.  I then placed final humeral stem with high offset tray and a +6 poly and final reduction of the shoulder.   After final reduction was performed, and the shoulder was copiously irrigated with saline irrigation.  A gram of vancomycin powder was then placed deep into the joint and the incision.  All instruments were removed.  Incision was then closed in layers with #1 Vicryl closure of the deltopectoral split, 0 Vicryl and 2-0 Vicryl subcutaneous closure, and  3-0 Monocryl for the skin . A sterile dressing was placed on the shoulder.    She was placed in a shoulder SlingShot brace and  transferred in stable, awake  condition to Postanesthesia Recovery. She will be maintained in the sling for 6 weeks postoperatively, may discontinue abduction pillow at 4 week postoperative and begin PT at approximately 2-3 weeks postoperatively.       Jn Gallegos MD, M.D.   Date: 8/11/2022  Time: 9:23 AM    Implants:  Implant Name Type Inv. Item Serial No.  Lot No. LRB No. Used Action   BASEPLATE STD 25MM - [id number] Total Joint Component/Insert BASEPLATE STD 25MM [id number] [employer]  Left 1 Implanted   GLEOSPHERE LATERALIZED STANDARD 36MM - [id number] Total Joint Component/Insert GLEOSPHERE LATERALIZED STANDARD 36MM [id number] [employer]  Left 1 Implanted   CAP END LCK AQLS FLX RV SHLDR [id number] - [id number] Metallic Hardware/[place] CAP END LCK AQLS FLX RV SHLDR [id number] [employer]  Left 1 Implanted   STEM HUM 90MM 11MM AQLS FLX RV SHLDR DIST - [id number] Total Joint Component/Insert STEM HUM 90MM 11MM AQLS FLX RV SHLDR DIST [id number] [employer]  Left 1 Implanted   SPACER HUM 30MM 11MM AQLS FLX RV SHLDR - [id number] Metallic Hardware/[place] SPACER HUM 30MM 11MM AQLS FLX RV SHLDR [id number] [employer]  Left 1 Implanted   SCREW BSPLT 30MM AQLS FLX RV SHLDR - [id number] Metallic Hardware/[place] SCREW BSPLT 30MM AQLS FLX RV SHLDR [id number] [employer]  Left 1 Implanted   BODY HUM 11MM AQLS FLX RV SHLDR PROX - [id number] Total Joint Component/Insert BODY HUM 11MM AQLS FLX RV SHLDR PROX [id number] [employer]  Left 1 Implanted   TRAY REVERSE HIGH OFFSET +0 [id number] - [id number] Total Joint Component/Insert TRAY REVERSE HIGH OFFSET +0 [id number] [employer]  Left 1 Implanted   INSERT REVISION REVERSE +6/12 36MM - [id number] Total Joint Component/Insert INSERT REVISION REVERSE +6/12 36MM [id number] [employer]  Left 1 Implanted   SCREW CENTRAL 6.5X30MM [id number] - [id number] Metallic Hardware/[place] SCREW CENTRAL 6.5X30MM [id number]  [facility]   Left 1 Implanted   SCREW PERIPHERAL 22MM - [id number] Metallic Hardware/[place] SCREW PERIPHERAL 22MM  [employer]  Left 1 Implanted   SCREW PERIPHERAL 5.0X30MM [id number] - [id number] Metallic Hardware/[place] SCREW PERIPHERAL 5.0X30MM [id number]  [employer]  Left 1 Implanted   SCREW PERIPHERAL 18MM [id number] - [id number] Metallic Hardware/[place] SCREW PERIPHERAL 18MM [id number]  [employer] [facility]  Left 1 Implanted

## 2022-08-11 NOTE — ANESTHESIA PREPROCEDURE EVALUATION
Anesthesia Pre-Procedure Evaluation    Patient: Destiney Agosto   MRN: [id number] : 1940        Procedure : Procedure(s):  REVERSE TOTAL SHOULDER ARTHROPLASTY          Past Medical History:   Diagnosis Date     Arrhythmia 2007    PVC's with bigeminy, trigeminy     Arthritis      Heart murmur      Hyperlipidemia      Hypertension      Irregular heart beat      Motion sickness      Thrombosis      Uncomplicated asthma       History reviewed. No pertinent surgical history.   Allergies   Allergen Reactions     Albuterol      Ibuprofen      Other reaction(s): possible TGA total global amnesia       Social History     Tobacco Use     Smoking status: Never Smoker     Smokeless tobacco: Never Used   Substance Use Topics     Alcohol use: Yes     Comment: 4-5 a week      Wt Readings from Last 1 Encounters:   22 67.1 kg (148 lb)        Anesthesia Evaluation   Pt has had prior anesthetic.     History of anesthetic complications  - motion sickness.      ROS/MED HX  ENT/Pulmonary:     (+) asthma  (-) sleep apnea   Neurologic:  - neg neurologic ROS     Cardiovascular:     (+) Dyslipidemia hypertension-----dysrhythmias (SVT), congenital heart disease PDA, pinpoint.  (-) CAD   METS/Exercise Tolerance:     Hematologic:       Musculoskeletal: Comment: Prior cervical spine fusion, posterior      GI/Hepatic:    (-) GERD and hiatal hernia   Renal/Genitourinary:  - neg Renal ROS     Endo:  - neg endo ROS  (-) obesity   Psychiatric/Substance Use:  - neg psychiatric ROS     Infectious Disease: Comment: Recent Results (from the past 120 hour(s))  -COVID-19 Virus (Coronavirus) by PCR Nose:   Collection Time: 22 10:58 AM  Specimen: Nose; Swab       Result                                            Value                         Ref Range                       SARS CoV2 PCR                                     Negative                      Negative                         Malignancy:  - neg malignancy ROS     Other:             Physical Exam    Airway      Comment: Limited neck extension and rotation    Mallampati: II   TM distance: > 3 FB   Neck ROM: limited   Mouth opening: > 3 cm    Respiratory Devices and Support         Dental  no notable dental history         Cardiovascular   cardiovascular exam normal          Pulmonary   pulmonary exam normal                OUTSIDE LABS:  CBC: No results found for: WBC, HGB, HCT, PLT  BMP:   Lab Results   Component Value Date     07/28/2022     08/05/2020    POTASSIUM 5.3 07/28/2022    POTASSIUM 4.5 08/05/2020    CHLORIDE 99 07/28/2022    CHLORIDE 103 08/05/2020    CO2 29 07/28/2022    CO2 29 08/05/2020    BUN 28.7 (H) 07/28/2022    BUN 24 08/05/2020    CR 1.13 (H) 07/28/2022    CR 1.12 (H) 08/05/2020    GLC 88 07/28/2022     08/05/2020     COAGS: No results found for: PTT, INR, FIBR  POC: No results found for: BGM, HCG, HCGS  HEPATIC:   Lab Results   Component Value Date    ALBUMIN 4.7 07/28/2022    PROTTOTAL 6.9 07/28/2022    ALT 19 07/28/2022    AST 19 07/28/2022    ALKPHOS 83 07/28/2022    BILITOTAL 0.3 07/28/2022     OTHER:   Lab Results   Component Value Date    ODELL 10.6 (H) 07/28/2022       Anesthesia Plan    ASA Status:  2   NPO Status:  NPO Appropriate    Anesthesia Type: General.     - Airway: LMA   Induction: Intravenous, Propofol.   Maintenance: Balanced.        Consents    Anesthesia Plan(s) and associated risks, benefits, and realistic alternatives discussed. Questions answered and patient/representative(s) expressed understanding.     - Discussed: Risks, Benefits and Alternatives for BOTH SEDATION and the PROCEDURE were discussed     - Discussed with:  Patient         Postoperative Care    Pain management: Peripheral nerve block (Single Shot), Multi-modal analgesia.   PONV prophylaxis: Ondansetron (or other 5HT-3), Background Propofol Infusion, Dexamethasone or Solumedrol     Comments:    Other Comments: Preop IS and superficial cervical plexus PNBs per  surgeon request  General LMA 4 - if challenging LMA placement 2/2 neck extension, will place ETT with glidescope  Decadron 10, zofran 4, TIVA  Phenylephrine gtt to maintain baseline MAP            Nicki Cox MD

## 2022-08-12 ENCOUNTER — APPOINTMENT (OUTPATIENT)
Dept: OCCUPATIONAL THERAPY | Facility: CLINIC | Age: 82
End: 2022-08-12
Attending: ORTHOPAEDIC SURGERY
Payer: MEDICARE

## 2022-08-12 VITALS
RESPIRATION RATE: 16 BRPM | BODY MASS INDEX: 27.94 KG/M2 | HEIGHT: 61 IN | DIASTOLIC BLOOD PRESSURE: 64 MMHG | WEIGHT: 148 LBS | OXYGEN SATURATION: 94 % | SYSTOLIC BLOOD PRESSURE: 138 MMHG | TEMPERATURE: 97.9 F | HEART RATE: 65 BPM

## 2022-08-12 LAB
GLUCOSE BLD-MCNC: 143 MG/DL (ref 70–125)
HGB BLD-MCNC: 12 G/DL (ref 11.7–15.7)

## 2022-08-12 PROCEDURE — 85018 HEMOGLOBIN: CPT

## 2022-08-12 PROCEDURE — 36415 COLL VENOUS BLD VENIPUNCTURE: CPT

## 2022-08-12 PROCEDURE — 97535 SELF CARE MNGMENT TRAINING: CPT | Mod: GO

## 2022-08-12 PROCEDURE — 97166 OT EVAL MOD COMPLEX 45 MIN: CPT | Mod: GO

## 2022-08-12 PROCEDURE — 82947 ASSAY GLUCOSE BLOOD QUANT: CPT | Performed by: ORTHOPAEDIC SURGERY

## 2022-08-12 PROCEDURE — 250N000013 HC RX MED GY IP 250 OP 250 PS 637: Performed by: INTERNAL MEDICINE

## 2022-08-12 PROCEDURE — 97110 THERAPEUTIC EXERCISES: CPT | Mod: GO

## 2022-08-12 PROCEDURE — 99232 SBSQ HOSP IP/OBS MODERATE 35: CPT | Performed by: INTERNAL MEDICINE

## 2022-08-12 PROCEDURE — 250N000013 HC RX MED GY IP 250 OP 250 PS 637

## 2022-08-12 RX ORDER — AMOXICILLIN 250 MG
1 CAPSULE ORAL 2 TIMES DAILY
COMMUNITY
Start: 2022-08-12 | End: 2023-09-12

## 2022-08-12 RX ORDER — POLYETHYLENE GLYCOL 3350 17 G/17G
17 POWDER, FOR SOLUTION ORAL DAILY
Qty: 510 G | COMMUNITY
Start: 2022-08-12 | End: 2023-09-12

## 2022-08-12 RX ORDER — ACETAMINOPHEN 325 MG/1
650 TABLET ORAL EVERY 4 HOURS PRN
COMMUNITY
Start: 2022-08-14 | End: 2023-09-12

## 2022-08-12 RX ORDER — OXYCODONE HYDROCHLORIDE 5 MG/1
2.5-5 TABLET ORAL EVERY 4 HOURS PRN
Qty: 25 TABLET | Refills: 0 | Status: SHIPPED | OUTPATIENT
Start: 2022-08-12 | End: 2023-09-12

## 2022-08-12 RX ADMIN — OXYCODONE HYDROCHLORIDE 5 MG: 5 TABLET ORAL at 09:25

## 2022-08-12 RX ADMIN — METOPROLOL SUCCINATE 25 MG: 25 TABLET, EXTENDED RELEASE ORAL at 09:25

## 2022-08-12 RX ADMIN — ACETAMINOPHEN 975 MG: 325 TABLET ORAL at 04:33

## 2022-08-12 RX ADMIN — POLYETHYLENE GLYCOL 3350 17 G: 17 POWDER, FOR SOLUTION ORAL at 09:27

## 2022-08-12 RX ADMIN — ASPIRIN 325 MG: 325 TABLET ORAL at 09:25

## 2022-08-12 RX ADMIN — FLECAINIDE ACETATE 50 MG: 50 TABLET ORAL at 09:25

## 2022-08-12 RX ADMIN — ATORVASTATIN CALCIUM 10 MG: 10 TABLET, FILM COATED ORAL at 09:25

## 2022-08-12 RX ADMIN — SENNOSIDES AND DOCUSATE SODIUM 1 TABLET: 50; 8.6 TABLET ORAL at 09:27

## 2022-08-12 RX ADMIN — CITALOPRAM HYDROBROMIDE 10 MG: 10 TABLET ORAL at 09:25

## 2022-08-12 ASSESSMENT — ACTIVITIES OF DAILY LIVING (ADL)
ADLS_ACUITY_SCORE: 36
IADL_COMMENTS: FAMILY WILL DO UNTIL PT IS RECOVERED

## 2022-08-12 NOTE — DISCHARGE SUMMARY
ORTHOPEDIC DISCHARGE SUMMARY       Destiney Agosto,  1940, MRN [id number]    Admission Date: 2022      Admission Diagnoses: Shoulder pain, left [M25.512]  S/P reverse total shoulder arthroplasty, left [Z96.612]     Discharge Date:  2022    Post-operative Day:  1 Day Post-Op    Reason for Admission: The patient was admitted for the following: Procedure(s):  REVERSE TOTAL SHOULDER ARTHROPLASTY    BRIEF HOSPITAL COURSE   Destiney Agosto is a pleasant 82 year old female who underwent the aforementioned procedure with Dr. Gallegos on 2022. There were no intraoperative complications and the patient was transferred to the recovery room and later the orthopedic unit in stable condition. Once the patient reached the orthopedic floor our orthopedic pain protocol was implemented along with the following:    Anticoagulation Medications: Xarelto  Therapy: PT and OT  Activity: NWB  Bracing: Slingshot Brace    Consultations during Admission: Hospitalist service for medical management.  Given that patient had a prior provoked DVT after surgery (prior C2-7 fusion & prior TSA), will start Xarelto 10 mg daily x 30 days for DVT prophylaxis. Discussed that she is at an increased risk of bleeding. She understands and agrees to take to prevent DVT. Discussed to take with food. She should discontinue aspirin while on xarelto. After 30 days of xarelto, may resume aspirin.    COMPLICATIONS/SIGNIFICANT FINDINGS    None    DISCHARGE INFORMATION   Condition at discharge: Good  Discharge destination: Home  Patient was seen by myself on the date of discharge.    FOLLOW UP CARE   Follow up with orthopedics in 2 weeks or sooner should the need arise. Ortho will continue to manage pain control, post op anticoagulation and incision care.     Follow up with your PCP for management of chronic medical problems and to evaluate for post op medical complications including constipation, nausea/vomiting, DVT/PE, anemia, changes in blood  "pressure, fevers/chills, urinary retention and atelectasis/pneumonia.     Subjective   Patient is doing well on POD #1. Pain is well controlled with oral medications. Ambulating. Tolerating oral intake. Voiding.    Physical Exam   /64 (BP Location: Right arm)   Pulse 65   Temp 97.9  F (36.6  C) (Oral)   Resp 16   Ht 1.549 m (5' 1\")   Wt 67.1 kg (148 lb)   SpO2 94%   BMI 27.96 kg/m    The patient is A&Ox3. Appears comfortable, sitting up at bedside & dressed. Wearing sling appropriately.  Sensation is intact to light touch in Left upper extremity, hand & fingers.  Motor: +5/5 left wrist extension & flexion, finger abduction. Full composite fist. Opposes thumb. Flexes elbow.  Palpable left radial pulse. Fingers warm, pink with good cap refill.  Calves are soft and non-tender.   Left shoulder incision is covered. Dressing C/D/I. Mild edema.    Pertinent Results at Discharge     Hemoglobin   Date/Time Value Ref Range Status   08/12/2022 05:16 AM 12.0 11.7 - 15.7 g/dL Final       Problem List   Active Problems:    S/P reverse total shoulder arthroplasty, left    Selene Azar PA-C/Dr. Gallegos  [facility]  [phone]  Date: 8/12/2022  Time: 9:57 AM    "

## 2022-08-12 NOTE — PLAN OF CARE
Patient vital signs are at baseline: Yes  Patient able to ambulate as they were prior to admission or with assist devices provided by therapies during their stay:  Yes, stand by assist with gb/walker  Patient MUST void prior to discharge:  Yes  Patient able to tolerate oral intake:  Yes  Pain has adequate pain control using Oral analgesics:  Yes, c/o armpit pain, well controlled with tylenol  Does patient have an identified :  Yes  Has goal D/C date and time been discussed with patient:  Yes    Alert and oriented x 4, able to make needs known, numbness on left thumb and index still present, but improving,  redness on back from strap, applied abd pad for cushion, dsg CDI.

## 2022-08-12 NOTE — UTILIZATION REVIEW
Admission Status; Secondary Review Determination   Under the authority of the Utilization Management Committee, the utilization review process indicated a secondary review on Destiney Agosto. The review outcome is based on review of the medical records, discussions with staff, and applying clinical experience noted on the date of the review.   (x) Outpatient Status with extended recovery is appropriate - This patient does not meet hospital inpatient criteria. If this patient's primary payer is Medicare and was admitted as an inpatient, Condition Code 44 should be used and patient status changed to outpatient recovery/Postprocedureal Care.    RATIONALE FOR DETERMINATION   82 yr old female with HTN on one med, SVT on flecainide and metoprolol, HLD and hx provoked DVT presented for left reverse total shoulder.  Doing well and discharging after one night.  Surgery not on Medicare IPO list and one night stay does not meet CMS inpatient criteria.  Patient was admitted to the hospital after the procedure. Patient has Medicare and the procedure is not on the CMS inpatient list. No documented complications or unexpected recovery. Patient can be safely  monitored for bleeding and recover in outpatient/extended recovery setting.  Dr. Dr. Gallegos/Selene Azar PA-C notified of this determination and agreeable to change in status.  The severity of illness, intensity of service provided, expected LOS and risk for adverse outcome doesn't meet inpatient hospital admission.   The information on this document is developed by the utilization review team in order for the business office to ensure compliance. This only denotes the appropriateness of proper admission status and does not reflect the quality of care rendered.   The definitions of Inpatient Status and Observation Status used in making the determination above are those provided in the CMS Coverage Manual, Chapter 1 and Chapter 6, section 70.4.   Sincerely,   Suzette PÉREZ  MD [place]  Utilization Review  Physician Advisor  [facility]

## 2022-08-12 NOTE — PROGRESS NOTES
"Addendum: Spoke with Dr. Patterson and Dr. Gallegos. Given that patient had a prior provoked DVT after surgery (prior C2-7 fusion & prior TSA), will start Xarelto 10 mg daily x 30 days for DVT prophylaxis. Discussed that she is at an increased risk of bleeding. She understands and agrees to take to prevent DVT. Discussed to take with food. She should discontinue aspirin while on xarelto. After 30 days of xarelto, may resume aspirin.    Selene Azar PA-C on 8/12/2022 at 10:34 AM      Orthopedic Progress Note      Assessment: 1 Day Post-Op  S/P Procedure(s):  REVERSE TOTAL SHOULDER ARTHROPLASTY     Plan:   - Continue OT.  - Weightbearing status: NWB Left Upper Extremity  - Continue sling x6 wks & abduction pillow x4 wks  - Anticoagulation: Will discuss with Hospitalist Aspirin 325 mg versus Xarelto given prior history of provoked DVT, in addition to SCDs, chase stockings and early ambulation.  - Discharge planning: Discharge to home today    Subjective:  Pain: Left shoulder pain well controlled on Tylenol and aspirin.  Nausea, Vomiting:  No  Chest pain: No  Lightheadedness, Dizziness:  No  Neuro: Block wearing off. Some tingling in left thumb & index finger.    Patient is doing well on POD #1. Tolerating oral intake, pain well controlled on oral medications, voiding & ambulating. Ready for discharge. Hgb 12.0.    Objective:  /64 (BP Location: Right arm)   Pulse 65   Temp 97.9  F (36.6  C) (Oral)   Resp 16   Ht 1.549 m (5' 1\")   Wt 67.1 kg (148 lb)   SpO2 94%   BMI 27.96 kg/m    The patient is A&Ox3. Appears comfortable, sitting up at bedside & dressed. Wearing sling appropriately.  Sensation is intact to light touch in Left upper extremity, hand & fingers.  Motor: +5/5 left wrist extension & flexion, finger abduction. Full composite fist. Opposes thumb. Flexes elbow.  Palpable left radial pulse. Fingers warm, pink with good cap refill.  Calves are soft and non-tender.   Left shoulder incision is covered. " Dressing C/D/I. Mild edema.    Pertinent Labs   Lab Results: personally reviewed.   No results found for: INR, PROTIME  Lab Results   Component Value Date    HGB 12.0 08/12/2022     Lab Results   Component Value Date     07/28/2022    CO2 29 07/28/2022     Report completed by:  Selene Azar PA-C  [facility]    Date: 8/12/2022  Time: 9:51 AM

## 2022-08-12 NOTE — PROGRESS NOTES
[facility]      OUTPATIENT OCCUPATIONAL THERAPY  EVALUATION  PLAN OF TREATMENT FOR OUTPATIENT REHABILITATION  (COMPLETE FOR INITIAL CLAIMS ONLY)  Patient's Last Name, First Name, M.I.  YOB: 1940  Destiney Agosto                          Provider's Name  [facility] Medical Record No.  [id number]                               Onset Date:  (P) 08/11/22   Start of Care Date:  (P) 08/12/22     Type:     ___PT   _X_OT   ___SLP Medical Diagnosis:  (P) reverse total shoulder                        OT Diagnosis:  (P) decreased ind with adls   Visits from SOC:  1   _________________________________________________________________________________  Plan of Treatment/Functional Goals    Planned Interventions: (P) ADL retraining, transfer training, home program guidelines   Goals: See Occupational Therapy Goals on Care Plan in [facility] electronic health record.    Therapy Frequency: (P) One time eval and treatment  Predicted Duration of Therapy Intervention:    _________________________________________________________________________________    I CERTIFY THE NEED FOR THESE SERVICES FURNISHED UNDER        THIS PLAN OF TREATMENT AND WHILE UNDER MY CARE     (Physician co-signature of this document indicates review and certification of the therapy plan).              Certification date from: (P) 08/12/22, Certification date to: (P) 08/19/22    Referring Physician: Jn Gallegos (P)            Initial Assessment        See Occupational Therapy evaluation dated (P) 08/12/22 in Epic electronic health record.

## 2022-08-12 NOTE — PLAN OF CARE
Patient vital signs are at baseline: Yes  Patient able to ambulate as they were prior to admission or with assist devices provided by therapies during their stay:  Yes  Patient MUST void prior to discharge:  Yes  Patient able to tolerate oral intake:  Yes  Pain has adequate pain control using Oral analgesics:  Yes  Does patient have an identified :  Yes  Has goal D/C date and time been discussed with patient:  Yes  Nurse teaching given on 8/12/2022 and the patient expresses understanding and acceptance of instructions. Jon Kidd RN 8/12/2022 10:31 AM    Goal Outcome Evaluation:    Plan of Care Reviewed With: patient     Overall Patient Progress: improving    Outcome Evaluation: discharge to home today

## 2022-08-12 NOTE — PROGRESS NOTES
08/12/22 0830   Quick Adds   Type of Visit Initial Occupational Therapy Evaluation   Living Environment   People in Home spouse   Current Living Arrangements other (see comments)  ([facility])   Living Environment Comments has standard toilet and walk in shower   Self-Care   Usual Activity Tolerance good   Current Activity Tolerance moderate   Activity/Exercise/Self-Care Comment v   Instrumental Activities of Daily Living (IADL)   IADL Comments Family will do until pt is recovered   General Information   Onset of Illness/Injury or Date of Surgery 08/11/22   Referring Physician Jn Gallegos   Patient/Family Therapy Goal Statement (OT) heal well   Additional Occupational Profile Info/Pertinent History of Current Problem has cervical fusion, had reverse TSA yesterday   Existing Precautions/Restrictions shoulder   Left Upper Extremity (Weight-bearing Status) non weight-bearing (NWB)   Cognitive Status Examination   Affect/Mental Status (Cognitive) WNL   Range of Motion Comprehensive   Comment, General Range of Motion left arm immobilized   Strength Comprehensive (MMT)   Comment, General Manual Muscle Testing (MMT) Assessment left arm immobilized   Bed Mobility   Comment (Bed Mobility) pt will sleep in a recliner at home   Transfers   Transfer Comments cga   Activities of Daily Living   BADL Assessment/Intervention upper body dressing;lower body dressing;toileting   Upper Body Dressing Assessment/Training   River Level (Upper Body Dressing) minimum assist (75% patient effort)   Lower Body Dressing Assessment/Training   River Level (Lower Body Dressing) minimum assist (75% patient effort)   Toileting   River Level (Toileting) minimum assist (75% patient effort)   Clinical Impression   Criteria for Skilled Therapeutic Interventions Met (OT) Yes, treatment indicated   OT Diagnosis decreased ind with adls   OT Problem List-Impairments impacting ADL post-surgical precautions   Assessment of Occupational  Performance 3-5 Performance Deficits   Identified Performance Deficits total body dressing, transfers, toileting, home ex program   Planned Therapy Interventions (OT) ADL retraining;transfer training;home program guidelines   Clinical Decision Making Complexity (OT) moderate complexity   Risk & Benefits of therapy have been explained care plan/treatment goals reviewed;patient;participants voiced agreement with care plan   OT Discharge Planning   OT Discharge Recommendation (DC Rec) (S)  home with assist   OT Rationale for DC Rec Pt has good support at home   Therapy Certification   Start of Care Date 08/12/22   Certification date from 08/12/22   Certification date to 08/19/22   Medical Diagnosis reverse total shoulder   Total Evaluation Time (Minutes)   Total Evaluation Time (Minutes) 15   OT Goals   Therapy Frequency (OT) One time eval and treatment   OT Goals Upper Body Dressing;Lower Body Dressing;Toilet Transfer/Toileting;OT Goal 1   OT: Upper Body Dressing Modified independent;within precautions;Goal Met;Completed   OT: Lower Body Dressing Modified independent;within precautions;Goal Met;Completed   OT: Toilet Transfer/Toileting Modified independent;toilet transfer;cleaning and garment management;Goal Met;Completed;within precautions   OT: Goal 1 Pt will complete home ex program:   Codman s, elbow, wrist and hand with mod I, met, completed

## 2022-08-12 NOTE — PLAN OF CARE
Patient vital signs are at baseline: Yes  Patient able to ambulate as they were prior to admission or with assist devices provided by therapies during their stay:  Yes  Patient MUST void prior to discharge:  Yes  Patient able to tolerate oral intake:  Yes .   Pain has adequate pain control using Oral analgesics:  Yes No pain stated this shift. Left arm still has block in tact. Able to wiggle fingers and has a moderate hand   Does patient have an identified :  Yes  Has goal D/C date and time been discussed with patient:  Yes

## 2022-08-12 NOTE — PROGRESS NOTES
Message received from Utilization Review RN stating that patient's admission status was changed from Inpatient to Outpatient, necessitating a Condition Code 44 letter be presented to patient.    Explained admission status change Code 44 letter to patient and spouse. This writer answered questions to their satisfaction and provided them a copy of document. Patient verbalized understanding of admission status changes.      KI DAVE, NINA/CM

## 2022-08-12 NOTE — PROGRESS NOTES
"[facility] Note      ASSESSMENT:    Active Problems:    S/P reverse total shoulder arthroplasty, left  History of provoked DVT  History of SVT  Hyperlipidemia    PLAN:     Hypertension:  Blood pressure is stable  Continue home Toprol-XL     History of SVT  Continue home Toprol-XL, flecainide     Dyslipidemia  Continue statin     History of depression  Continue home medications     H/O Provoked DVT  She was treated with Xarelto  DVT prophylaxis defer to orthopedics.     Left shoulder rotator cuff arthropathy status post reverse total shoulder arthroplasty: Postop day #1.  Continue PT OT, pain control, DVT prophylaxis per orthopedic surgery.      -Patient stable for discharge from hospitalist standpoint  -We will have her continue with home medications, no dosing changes were made  -She will continue with full dose aspirin, and shoulder surgery would be a low risk for DVT      Den Patterson D.O.              -------------------------------------------------------------------------------------------------------------  SUBJECTIVE: NAD. Denies any nausea, vomiting, abdominal pain, chest pain, SOB, new swelling, fevers, chills, confusion or headache.  Pain mostly controlled in the left shoulder.    Exam:  /64 (BP Location: Right arm)   Pulse 65   Temp 97.9  F (36.6  C) (Oral)   Resp 16   Ht 1.549 m (5' 1\")   Wt 67.1 kg (148 lb)   SpO2 94%   BMI 27.96 kg/m    General: NAD  RESPIRATORY: Breathing nonlabored, clear to auscultation bilaterally.  CARDIOVASCULAR: No le edema bilat. no posterior calf tenderness.  ABDOMEN: soft and non-tender.  Normoactive bowel sounds.  MUSCULOSKELETAL: Muscle and joints without inflammation.  Left shoulder immobilized with brace.  NEUROLOGIC: Non focal, motor and sensory intact, speech clear  PSYCHIATRIC: Oriented X 3, without confusion or delirium, behavior appropriate, affect normal    Diagnostics Reviewed:      Recent Results (from the past 24 hour(s)) "   Hemoglobin    Collection Time: 08/12/22  5:16 AM   Result Value Ref Range    Hemoglobin 12.0 11.7 - 15.7 g/dL   Glucose    Collection Time: 08/12/22  5:16 AM   Result Value Ref Range    Glucose 143 (H) 70 - 125 mg/dL

## 2022-08-19 ENCOUNTER — OFFICE VISIT (OUTPATIENT)
Dept: CARDIOLOGY | Facility: CLINIC | Age: 82
End: 2022-08-19
Payer: MEDICARE

## 2022-08-19 VITALS
DIASTOLIC BLOOD PRESSURE: 60 MMHG | HEART RATE: 75 BPM | BODY MASS INDEX: 27.78 KG/M2 | RESPIRATION RATE: 14 BRPM | WEIGHT: 147 LBS | SYSTOLIC BLOOD PRESSURE: 122 MMHG

## 2022-08-19 DIAGNOSIS — I10 ESSENTIAL HYPERTENSION: ICD-10-CM

## 2022-08-19 DIAGNOSIS — I47.10 PAROXYSMAL SUPRAVENTRICULAR TACHYCARDIA (H): ICD-10-CM

## 2022-08-19 DIAGNOSIS — I47.10 SVT (SUPRAVENTRICULAR TACHYCARDIA) (H): Primary | ICD-10-CM

## 2022-08-19 PROCEDURE — 99213 OFFICE O/P EST LOW 20 MIN: CPT | Performed by: INTERNAL MEDICINE

## 2022-08-19 RX ORDER — METOPROLOL SUCCINATE 25 MG/1
25 TABLET, EXTENDED RELEASE ORAL DAILY
Qty: 90 TABLET | Refills: 3 | Status: SHIPPED | OUTPATIENT
Start: 2022-08-19 | End: 2023-01-09

## 2022-08-19 RX ORDER — FLECAINIDE ACETATE 50 MG/1
50 TABLET ORAL 2 TIMES DAILY
Qty: 180 TABLET | Refills: 3 | Status: SHIPPED | OUTPATIENT
Start: 2022-08-19 | End: 2023-01-09

## 2022-08-19 NOTE — LETTER
Date:August 19, 2022      Provider requested that no letter be sent. Do not send.       [facility]

## 2022-08-19 NOTE — LETTER
8/19/2022    [facility]  No address on file    RE: Destiney Agosto       Dear Colleague,     I had the pleasure of seeing Destiney Agosto in the [facility].      Thank you, Kacey Coyle PA-C, for asking the [facility] team to see Ms. Destiney Agosto to follow-up on PSVT.    Assessment/Recommendations   Assessment:    1.  PSVT, well suppressed with combination of low-dose metoprolol and Tambocor.  She reports brief fluttering's, mostly when she is lying in bed.  No prolonged episodes.  At this point we will continue with current medications.  Recent ECG demonstrated sinus bradycardia with normal intervals.  Denies any exertional chest discomfort.  2.  Essential hypertension, well controlled on medications.    Plan:  1.  Continue current medications  2.  Follow-up in 1 year or sooner if clinically needed       History of Present Illness    Ms. Destiney Agosto is a 82 year old female with history of PSVT, currently suppressed with metoprolol and Tambocor who presents today for a follow-up visit.  1 year ago, we decreased her dose of metoprolol due to more profound bradycardia.  Despite this, she has done well over the year with no episodes of rapid heart beating.  Denies any chest discomfort.  Recently underwent left reverse shoulder surgery without cardiac complication.  Has been recovering well.  Denies any symptoms of exertional chest discomfort or shortness of breath.    ECG (personally reviewed): No ECG today.  Recent preoperative ECG demonstrated sinus bradycardia, rate 55 with normal axis and intervals.  No ischemic changes.    Cardiac Imaging Studies (personally reviewed): No recent cardiac imaging     Physical Examination Review of Systems   /60 (BP Location: Left arm, Patient Position: Sitting, Cuff Size: Adult Regular)   Pulse 75   Resp 14   Wt 66.7 kg (147 lb)   BMI 27.78 kg/m    Body mass index is 27.78 kg/m .  Wt Readings from Last 3  Encounters:   08/19/22 66.7 kg (147 lb)   08/11/22 67.1 kg (148 lb)   09/01/21 64.9 kg (143 lb)     General Appearance:   Awake, Alert, No acute distress.   HEENT:  No scleral icterus; the mucous membranes were pink and moist.   Neck: No cervical bruits or jugular venous distention    Chest: The spine was straight. The chest was symmetric.   Lungs:   Respirations unlabored; the lungs are clear to auscultation. No wheezing   Cardiovascular:    Regular rate and rhythm.  S1, S2 normal.  No murmur or gallop   Abdomen:  No organomegaly, masses, bruits, or tenderness. Bowels sounds are present   Extremities:  No peripheral edema bilaterally   Skin: No xanthelasma. Warm, Dry.   Musculoskeletal: No tenderness.   Neurologic: Mood and affect are appropriate.    Enc Vitals  BP: 122/60  Pulse: 75  Resp: 14  Weight: 66.7 kg (147 lb)                                         Medical History  Surgical History Family History Social History   Past Medical History:   Diagnosis Date     Arrhythmia 01/01/2007    PVC's with bigeminy, trigeminy     Arthritis      Heart murmur      Hyperlipidemia      Hypertension      Irregular heart beat      Motion sickness      Thrombosis      Uncomplicated asthma     Past Surgical History:   Procedure Laterality Date     REVERSE ARTHROPLASTY SHOULDER Left 8/11/2022    Procedure: REVERSE TOTAL SHOULDER ARTHROPLASTY LEFT;  Surgeon: Jn Gallegos MD;  Location: [facility] Main OR    Family History   Problem Relation Age of Onset     Cerebrovascular Disease Mother      Aortic aneurysm Father     Social History     Socioeconomic History     Marital status:      Spouse name: Not on file     Number of children: Not on file     Years of education: Not on file     Highest education level: Not on file   Occupational History     Not on file   Tobacco Use     Smoking status: Never Smoker     Smokeless tobacco: Never Used   Vaping Use     Vaping Use: Never used   Substance and Sexual Activity     Alcohol use:  Yes     Comment: 4-5 a week     Drug use: Never     Sexual activity: Not on file   Other Topics Concern     Not on file   Social History Narrative     Not on file     Social Determinants of Health     Financial Resource Strain: Not on file   Food Insecurity: Not on file   Transportation Needs: Not on file   Physical Activity: Not on file   Stress: Not on file   Social Connections: Not on file   Intimate Partner Violence: Not on file   Housing Stability: Not on file          Medications  Allergies   Current Outpatient Medications   Medication Sig Dispense Refill     acetaminophen (TYLENOL) 325 MG tablet Take 2 tablets (650 mg) by mouth every 4 hours as needed for other (For optimal non-opioid multimodal pain management to improve pain control.)       ascorbic acid, vitamin C, (ASCORBIC ACID WITH CARYN HIPS) 500 MG tablet [ASCORBIC ACID, VITAMIN C, (ASCORBIC ACID WITH CARYN HIPS) 500 MG TABLET] Take 500 mg by mouth daily.       atorvastatin (LIPITOR) 10 MG tablet Take 10 mg by mouth daily       biotin 5,000 mcg TbDL [BIOTIN 5,000 MCG TBDL] Take 1 tablet by mouth daily.       CALCIUM-MAGNESIUM-ZINC ORAL [CALCIUM-MAGNESIUM-ZINC ORAL] Take 1 tablet by mouth 2 (two) times a day.       cholecalciferol, vitamin D3, (VITAMIN D3) 2,000 unit capsule Take 2,000 Units by mouth daily       citalopram (CELEXA) 10 MG tablet [CITALOPRAM (CELEXA) 10 MG TABLET] Take 10 mg by mouth daily.       flecainide (TAMBOCOR) 50 MG tablet Take 1 tablet (50 mg) by mouth 2 times daily 180 tablet 3     folic acid/multivit-min/lutein (CENTRUM SILVER ORAL) [FOLIC ACID/MULTIVIT-MIN/LUTEIN (CENTRUM SILVER ORAL)] Take 1 tablet by mouth daily.       garlic 1,000 mg cap Take 1 capsule by mouth daily       metoprolol succinate ER (TOPROL XL) 25 MG 24 hr tablet Take 1 tablet (25 mg) by mouth daily 90 tablet 3     omega 3 1200 MG CAPS Take 1 capsule by mouth daily       oxyCODONE (ROXICODONE) 5 MG tablet Take 0.5-1 tablets (2.5-5 mg) by mouth every 4 hours as  needed for severe pain ((pain rating 7-10)) 25 tablet 0     polyethylene glycol (MIRALAX) 17 GM/Dose powder Take 17 g by mouth daily 510 g      rivaroxaban ANTICOAGULANT (XARELTO) 10 MG TABS tablet Take 1 tablet (10 mg) by mouth daily (with dinner) 30 tablet 0     senna-docusate (SENOKOT-S/PERICOLACE) 8.6-50 MG tablet Take 1 tablet by mouth 2 times daily        Allergies   Allergen Reactions     Albuterol      Ibuprofen      Other reaction(s): possible TGA total global amnesia          Lab Results    Chemistry/lipid CBC Cardiac Enzymes/BNP/TSH/INR   Recent Labs   Lab Test 07/28/22  1210   TRIG 229*   LDL 73   BUN 28.7*      CO2 29    Recent Labs   Lab Test 08/12/22  0516   HGB 12.0    No results for input(s): CKTOTAL, CKMB, TROPONINI, BNP, TSH, INR in the last 86505 hours.    Invalid input(s): CKMBINDEX     A total of 20 minutes was spent reviewing patient's medical records, obtaining history and performing examination, as well as discussing diagnoses/ recommendations with patient and answering all questions.                          Thank you for allowing me to participate in the care of your patient.      Sincerely,     Tianna Kumar MD     [facility]  cc:   Tianna Kumar MD  [address], [address]  [place] [address]

## 2022-08-19 NOTE — PROGRESS NOTES
Thank you, Kacey Coyle PA-C, for asking the [facility] team to see Ms. Destiney Agosto to follow-up on PSVT.    Assessment/Recommendations   Assessment:    1.  PSVT, well suppressed with combination of low-dose metoprolol and Tambocor.  She reports brief fluttering's, mostly when she is lying in bed.  No prolonged episodes.  At this point we will continue with current medications.  Recent ECG demonstrated sinus bradycardia with normal intervals.  Denies any exertional chest discomfort.  2.  Essential hypertension, well controlled on medications.    Plan:  1.  Continue current medications  2.  Follow-up in 1 year or sooner if clinically needed       History of Present Illness    Ms. Destiney Agosto is a 82 year old female with history of PSVT, currently suppressed with metoprolol and Tambocor who presents today for a follow-up visit.  1 year ago, we decreased her dose of metoprolol due to more profound bradycardia.  Despite this, she has done well over the year with no episodes of rapid heart beating.  Denies any chest discomfort.  Recently underwent left reverse shoulder surgery without cardiac complication.  Has been recovering well.  Denies any symptoms of exertional chest discomfort or shortness of breath.    ECG (personally reviewed): No ECG today.  Recent preoperative ECG demonstrated sinus bradycardia, rate 55 with normal axis and intervals.  No ischemic changes.    Cardiac Imaging Studies (personally reviewed): No recent cardiac imaging     Physical Examination Review of Systems   /60 (BP Location: Left arm, Patient Position: Sitting, Cuff Size: Adult Regular)   Pulse 75   Resp 14   Wt 66.7 kg (147 lb)   BMI 27.78 kg/m    Body mass index is 27.78 kg/m .  Wt Readings from Last 3 Encounters:   08/19/22 66.7 kg (147 lb)   08/11/22 67.1 kg (148 lb)   09/01/21 64.9 kg (143 lb)     General Appearance:   Awake, Alert, No acute distress.   HEENT:  No scleral icterus; the mucous  membranes were pink and moist.   Neck: No cervical bruits or jugular venous distention    Chest: The spine was straight. The chest was symmetric.   Lungs:   Respirations unlabored; the lungs are clear to auscultation. No wheezing   Cardiovascular:    Regular rate and rhythm.  S1, S2 normal.  No murmur or gallop   Abdomen:  No organomegaly, masses, bruits, or tenderness. Bowels sounds are present   Extremities:  No peripheral edema bilaterally   Skin: No xanthelasma. Warm, Dry.   Musculoskeletal: No tenderness.   Neurologic: Mood and affect are appropriate.    Enc Vitals  BP: 122/60  Pulse: 75  Resp: 14  Weight: 66.7 kg (147 lb)                                         Medical History  Surgical History Family History Social History   Past Medical History:   Diagnosis Date     Arrhythmia 01/01/2007    PVC's with bigeminy, trigeminy     Arthritis      Heart murmur      Hyperlipidemia      Hypertension      Irregular heart beat      Motion sickness      Thrombosis      Uncomplicated asthma     Past Surgical History:   Procedure Laterality Date     REVERSE ARTHROPLASTY SHOULDER Left 8/11/2022    Procedure: REVERSE TOTAL SHOULDER ARTHROPLASTY LEFT;  Surgeon: Jn Gallegos MD;  Location: [facility] Main OR    Family History   Problem Relation Age of Onset     Cerebrovascular Disease Mother      Aortic aneurysm Father     Social History     Socioeconomic History     Marital status:      Spouse name: Not on file     Number of children: Not on file     Years of education: Not on file     Highest education level: Not on file   Occupational History     Not on file   Tobacco Use     Smoking status: Never Smoker     Smokeless tobacco: Never Used   Vaping Use     Vaping Use: Never used   Substance and Sexual Activity     Alcohol use: Yes     Comment: 4-5 a week     Drug use: Never     Sexual activity: Not on file   Other Topics Concern     Not on file   Social History Narrative     Not on file     Social Determinants of  Health     Financial Resource Strain: Not on file   Food Insecurity: Not on file   Transportation Needs: Not on file   Physical Activity: Not on file   Stress: Not on file   Social Connections: Not on file   Intimate Partner Violence: Not on file   Housing Stability: Not on file          Medications  Allergies   Current Outpatient Medications   Medication Sig Dispense Refill     acetaminophen (TYLENOL) 325 MG tablet Take 2 tablets (650 mg) by mouth every 4 hours as needed for other (For optimal non-opioid multimodal pain management to improve pain control.)       ascorbic acid, vitamin C, (ASCORBIC ACID WITH CARYN HIPS) 500 MG tablet [ASCORBIC ACID, VITAMIN C, (ASCORBIC ACID WITH CARYN HIPS) 500 MG TABLET] Take 500 mg by mouth daily.       atorvastatin (LIPITOR) 10 MG tablet Take 10 mg by mouth daily       biotin 5,000 mcg TbDL [BIOTIN 5,000 MCG TBDL] Take 1 tablet by mouth daily.       CALCIUM-MAGNESIUM-ZINC ORAL [CALCIUM-MAGNESIUM-ZINC ORAL] Take 1 tablet by mouth 2 (two) times a day.       cholecalciferol, vitamin D3, (VITAMIN D3) 2,000 unit capsule Take 2,000 Units by mouth daily       citalopram (CELEXA) 10 MG tablet [CITALOPRAM (CELEXA) 10 MG TABLET] Take 10 mg by mouth daily.       flecainide (TAMBOCOR) 50 MG tablet Take 1 tablet (50 mg) by mouth 2 times daily 180 tablet 3     folic acid/multivit-min/lutein (CENTRUM SILVER ORAL) [FOLIC ACID/MULTIVIT-MIN/LUTEIN (CENTRUM SILVER ORAL)] Take 1 tablet by mouth daily.       garlic 1,000 mg cap Take 1 capsule by mouth daily       metoprolol succinate ER (TOPROL XL) 25 MG 24 hr tablet Take 1 tablet (25 mg) by mouth daily 90 tablet 3     omega 3 1200 MG CAPS Take 1 capsule by mouth daily       oxyCODONE (ROXICODONE) 5 MG tablet Take 0.5-1 tablets (2.5-5 mg) by mouth every 4 hours as needed for severe pain ((pain rating 7-10)) 25 tablet 0     polyethylene glycol (MIRALAX) 17 GM/Dose powder Take 17 g by mouth daily 510 g      rivaroxaban ANTICOAGULANT (XARELTO) 10 MG TABS  tablet Take 1 tablet (10 mg) by mouth daily (with dinner) 30 tablet 0     senna-docusate (SENOKOT-S/PERICOLACE) 8.6-50 MG tablet Take 1 tablet by mouth 2 times daily        Allergies   Allergen Reactions     Albuterol      Ibuprofen      Other reaction(s): possible TGA total global amnesia          Lab Results    Chemistry/lipid CBC Cardiac Enzymes/BNP/TSH/INR   Recent Labs   Lab Test 07/28/22  1210   TRIG 229*   LDL 73   BUN 28.7*      CO2 29    Recent Labs   Lab Test 08/12/22  0516   HGB 12.0    No results for input(s): CKTOTAL, CKMB, TROPONINI, BNP, TSH, INR in the last 35254 hours.    Invalid input(s): CKMBINDEX     A total of 20 minutes was spent reviewing patient's medical records, obtaining history and performing examination, as well as discussing diagnoses/ recommendations with patient and answering all questions.

## 2023-01-09 DIAGNOSIS — I47.10 PAROXYSMAL SUPRAVENTRICULAR TACHYCARDIA (H): ICD-10-CM

## 2023-01-09 DIAGNOSIS — I10 ESSENTIAL HYPERTENSION: ICD-10-CM

## 2023-01-09 DIAGNOSIS — I47.10 SVT (SUPRAVENTRICULAR TACHYCARDIA) (H): Primary | ICD-10-CM

## 2023-01-09 RX ORDER — METOPROLOL SUCCINATE 25 MG/1
25 TABLET, EXTENDED RELEASE ORAL DAILY
Qty: 90 TABLET | Refills: 1 | Status: SHIPPED | OUTPATIENT
Start: 2023-01-09 | End: 2023-07-10

## 2023-01-09 RX ORDER — FLECAINIDE ACETATE 50 MG/1
50 TABLET ORAL 2 TIMES DAILY
Qty: 180 TABLET | Refills: 1 | Status: SHIPPED | OUTPATIENT
Start: 2023-01-09 | End: 2023-07-31

## 2023-07-08 DIAGNOSIS — I47.10 PAROXYSMAL SUPRAVENTRICULAR TACHYCARDIA (H): ICD-10-CM

## 2023-07-10 RX ORDER — METOPROLOL SUCCINATE 25 MG/1
TABLET, EXTENDED RELEASE ORAL
Qty: 90 TABLET | Refills: 3 | Status: SHIPPED | OUTPATIENT
Start: 2023-07-10

## 2023-07-26 ENCOUNTER — TRANSFERRED RECORDS (OUTPATIENT)
Dept: HEALTH INFORMATION MANAGEMENT | Facility: CLINIC | Age: 83
End: 2023-07-26

## 2023-07-26 ENCOUNTER — LAB REQUISITION (OUTPATIENT)
Dept: LAB | Facility: CLINIC | Age: 83
End: 2023-07-26
Payer: MEDICARE

## 2023-07-26 DIAGNOSIS — I10 ESSENTIAL (PRIMARY) HYPERTENSION: ICD-10-CM

## 2023-07-26 DIAGNOSIS — E78.5 HYPERLIPIDEMIA, UNSPECIFIED: ICD-10-CM

## 2023-07-26 PROCEDURE — 80053 COMPREHEN METABOLIC PANEL: CPT | Mod: ORL | Performed by: PHYSICIAN ASSISTANT

## 2023-07-26 PROCEDURE — 80061 LIPID PANEL: CPT | Mod: ORL | Performed by: PHYSICIAN ASSISTANT

## 2023-07-27 LAB
ALBUMIN SERPL BCG-MCNC: 4.6 G/DL (ref 3.5–5.2)
ALP SERPL-CCNC: 71 U/L (ref 35–104)
ALT SERPL W P-5'-P-CCNC: 19 U/L (ref 0–50)
ANION GAP SERPL CALCULATED.3IONS-SCNC: 13 MMOL/L (ref 7–15)
AST SERPL W P-5'-P-CCNC: 19 U/L (ref 0–45)
BILIRUB SERPL-MCNC: 0.5 MG/DL
BUN SERPL-MCNC: 26.9 MG/DL (ref 8–23)
CALCIUM SERPL-MCNC: 10.3 MG/DL (ref 8.8–10.2)
CHLORIDE SERPL-SCNC: 101 MMOL/L (ref 98–107)
CHOLEST SERPL-MCNC: 178 MG/DL
CREAT SERPL-MCNC: 1.04 MG/DL (ref 0.51–0.95)
DEPRECATED HCO3 PLAS-SCNC: 28 MMOL/L (ref 22–29)
GFR SERPL CREATININE-BSD FRML MDRD: 52 ML/MIN/1.73M2
GLUCOSE SERPL-MCNC: 86 MG/DL (ref 70–99)
HDLC SERPL-MCNC: 65 MG/DL
LDLC SERPL CALC-MCNC: 63 MG/DL
NONHDLC SERPL-MCNC: 113 MG/DL
POTASSIUM SERPL-SCNC: 5 MMOL/L (ref 3.4–5.3)
PROT SERPL-MCNC: 6.5 G/DL (ref 6.4–8.3)
SODIUM SERPL-SCNC: 142 MMOL/L (ref 136–145)
TRIGL SERPL-MCNC: 252 MG/DL

## 2023-07-29 DIAGNOSIS — I47.10 PAROXYSMAL SUPRAVENTRICULAR TACHYCARDIA (H): ICD-10-CM

## 2023-07-31 RX ORDER — FLECAINIDE ACETATE 50 MG/1
TABLET ORAL
Qty: 180 TABLET | Refills: 3 | Status: SHIPPED | OUTPATIENT
Start: 2023-07-31

## 2023-09-12 ENCOUNTER — OFFICE VISIT (OUTPATIENT)
Dept: CARDIOLOGY | Facility: CLINIC | Age: 83
End: 2023-09-12
Attending: INTERNAL MEDICINE
Payer: MEDICARE

## 2023-09-12 VITALS
HEIGHT: 61 IN | DIASTOLIC BLOOD PRESSURE: 72 MMHG | SYSTOLIC BLOOD PRESSURE: 132 MMHG | BODY MASS INDEX: 27.75 KG/M2 | RESPIRATION RATE: 16 BRPM | WEIGHT: 147 LBS | HEART RATE: 60 BPM

## 2023-09-12 DIAGNOSIS — I10 ESSENTIAL HYPERTENSION: ICD-10-CM

## 2023-09-12 DIAGNOSIS — I47.10 SVT (SUPRAVENTRICULAR TACHYCARDIA) (H): ICD-10-CM

## 2023-09-12 DIAGNOSIS — Z51.81 ENCOUNTER FOR THERAPEUTIC DRUG MONITORING: Primary | ICD-10-CM

## 2023-09-12 PROCEDURE — 99214 OFFICE O/P EST MOD 30 MIN: CPT | Performed by: INTERNAL MEDICINE

## 2023-09-12 PROCEDURE — 93000 ELECTROCARDIOGRAM COMPLETE: CPT | Performed by: INTERNAL MEDICINE

## 2023-09-12 RX ORDER — SENNOSIDES 8.6 MG
650 CAPSULE ORAL EVERY 8 HOURS PRN
COMMUNITY

## 2023-09-12 NOTE — PROGRESS NOTES
Thank you for asking the [facility] team to see Ms. Destiney Agosto to follow-up on PSVT.    Assessment/Recommendations   Assessment:    1.  PSVT, fairly well suppressed with combination of low-dose metoprolol and Tambocor.  She does report brief episodes of fluttering, predominantly at nighttime when she is lying in bed.  No prolonged episodes.  We will check an ECG today to verify normal cardiac intervals.  Also recommended a 1 week event monitor to assess what her palpitations may be.  She currently denies any exertional chest discomfort and tells me they will be relocating to [facility] which is where they have spend their luis in the past but will live there now full-time.  Recommended establishing with a cardiologist there.  2.  Essential hypertension, well controlled    Plan:  1.  Continue current medications  2.  1 week event monitor with further recommendations to follow  3.  Establish with cardiologist in [place], [place] following move       History of Present Illness    Ms. Destiney Agosto is a 83 year old female with history of PSVT, currently suppressed with metoprolol and low-dose Tambocor who presents today for follow-up visit.  She has been doing fairly well over the course of the last year but informs me today that she and her  will be moving at the end of the month to [facility] permanently.  They have been spending their luis there but are now moving there to live year around.  She does continue to note symptoms of palpitations or fluttering when she lays down in bed at night.  Does not notice them during the daytime when she is up and about.  Denies any lightheadedness or feeling of near syncope.  No symptoms of exertional chest discomfort or dyspnea.    ECG (personally reviewed): ECG today demonstrates normal sinus rhythm, normal axis and intervals.    Cardiac Imaging Studies (personally reviewed): No recent cardiac imaging     Physical Examination  "Review of Systems   /72 (BP Location: Left arm, Patient Position: Sitting, Cuff Size: Adult Regular)   Pulse 60   Resp 16   Ht 1.549 m (5' 1\")   Wt 66.7 kg (147 lb)   BMI 27.78 kg/m    Body mass index is 27.78 kg/m .  Wt Readings from Last 3 Encounters:   09/12/23 66.7 kg (147 lb)   08/19/22 66.7 kg (147 lb)   08/11/22 67.1 kg (148 lb)     General Appearance:   Awake, Alert, No acute distress.   HEENT:  No scleral icterus; the mucous membranes were pink and moist.   Neck: No cervical bruits or jugular venous distention    Chest: The spine was straight. The chest was symmetric.   Lungs:   Respirations unlabored; the lungs are clear to auscultation. No wheezing   Cardiovascular:   Regular rate and rhythm.  S1, S2 normal.  No murmur or gallop   Abdomen:  No organomegaly, masses, bruits, or tenderness. Bowels sounds are present   Extremities: No peripheral edema bilaterally   Skin: No xanthelasma. Warm, Dry.   Musculoskeletal: No tenderness.   Neurologic: Mood and affect are appropriate.    Enc Vitals  BP: 132/72  Pulse: 60  Resp: 16  Weight: 66.7 kg (147 lb) (Taken at home this AM.)  Height: 154.9 cm (5' 1\")                                         Medical History  Surgical History Family History Social History   Past Medical History:   Diagnosis Date     Arrhythmia 01/01/2007    PVC's with bigeminy, trigeminy     Arthritis      Heart murmur      Hyperlipidemia      Hypertension      Irregular heart beat      Motion sickness      Thrombosis      Uncomplicated asthma     Past Surgical History:   Procedure Laterality Date     REVERSE ARTHROPLASTY SHOULDER Left 8/11/2022    Procedure: REVERSE TOTAL SHOULDER ARTHROPLASTY LEFT;  Surgeon: Jn Gallegos MD;  Location: [facility] Main OR    Family History   Problem Relation Age of Onset     Cerebrovascular Disease Mother      Aortic aneurysm Father     Social History     Socioeconomic History     Marital status:      Spouse name: Not on file     Number of " children: Not on file     Years of education: Not on file     Highest education level: Not on file   Occupational History     Not on file   Tobacco Use     Smoking status: Never     Smokeless tobacco: Never   Vaping Use     Vaping Use: Never used   Substance and Sexual Activity     Alcohol use: Yes     Comment: 4-5 a week     Drug use: Never     Sexual activity: Not on file   Other Topics Concern     Not on file   Social History Narrative     Not on file     Social Determinants of Health     Financial Resource Strain: Not on file   Food Insecurity: Not on file   Transportation Needs: Not on file   Physical Activity: Not on file   Stress: Not on file   Social Connections: Not on file   Intimate Partner Violence: Not on file   Housing Stability: Not on file          Medications  Allergies   Current Outpatient Medications   Medication Sig Dispense Refill     acetaminophen (TYLENOL 8 HOUR ARTHRITIS PAIN) 650 MG CR tablet Take 650 mg by mouth every 8 hours as needed for mild pain       ascorbic acid, vitamin C, (ASCORBIC ACID WITH CARYN HIPS) 500 MG tablet [ASCORBIC ACID, VITAMIN C, (ASCORBIC ACID WITH CARYN HIPS) 500 MG TABLET] Take 500 mg by mouth daily.       atorvastatin (LIPITOR) 10 MG tablet Take 10 mg by mouth daily       biotin 5,000 mcg TbDL [BIOTIN 5,000 MCG TBDL] Take 1 tablet by mouth daily.       CALCIUM-MAGNESIUM-ZINC ORAL [CALCIUM-MAGNESIUM-ZINC ORAL] Take 1 tablet by mouth 2 (two) times a day.       cholecalciferol, vitamin D3, (VITAMIN D3) 2,000 unit capsule Take 2,000 Units by mouth daily       citalopram (CELEXA) 10 MG tablet [CITALOPRAM (CELEXA) 10 MG TABLET] Take 10 mg by mouth daily.       flecainide (TAMBOCOR) 50 MG tablet Take 1 tablet by mouth twice daily. 180 tablet 3     folic acid/multivit-min/lutein (CENTRUM SILVER ORAL) [FOLIC ACID/MULTIVIT-MIN/LUTEIN (CENTRUM SILVER ORAL)] Take 1 tablet by mouth daily.       garlic 1,000 mg cap Take 1 capsule by mouth daily       glycerin-hypromellose-  (VISINE) 0.2-0.2-1 % SOLN ophthalmic solution Place 1 drop into both eyes as needed for dry eyes (For Dry Eyes. REFRESH DROPS.)       metoprolol succinate ER (TOPROL XL) 25 MG 24 hr tablet Take 1 tablet by mouth daily. 90 tablet 3     omega 3 1200 MG CAPS Take 1 capsule by mouth daily        Allergies   Allergen Reactions     Albuterol      Ibuprofen      Other reaction(s): possible TGA total global amnesia          Lab Results    Chemistry/lipid CBC Cardiac Enzymes/BNP/TSH/INR   Recent Labs   Lab Test 07/26/23  1213   TRIG 252*   LDL 63   BUN 26.9*      CO2 28    Recent Labs   Lab Test 08/12/22  0516   HGB 12.0    No results for input(s): CKTOTAL, CKMB, TROPONINI, BNP, TSH, INR in the last 14317 hours.    Invalid input(s): CKMBINDEX     A total of 30 minutes was spent reviewing patient's medical records, obtaining history and performing examination, as well as discussing diagnoses/ recommendations with patient and answering all questions.

## 2023-09-12 NOTE — LETTER
9/12/2023    [facility] [place] [facility]  [address]  [facility] [address]    RE: Destiney Agosto       Dear Colleague,     I had the pleasure of seeing Destiney Agosto in the [facility].      Thank you for asking the [facility] team to see Ms. Destiney Agosto to follow-up on PSVT.    Assessment/Recommendations   Assessment:    1.  PSVT, fairly well suppressed with combination of low-dose metoprolol and Tambocor.  She does report brief episodes of fluttering, predominantly at nighttime when she is lying in bed.  No prolonged episodes.  We will check an ECG today to verify normal cardiac intervals.  Also recommended a 1 week event monitor to assess what her palpitations may be.  She currently denies any exertional chest discomfort and tells me they will be relocating to [facility] which is where they have spend their luis in the past but will live there now full-time.  Recommended establishing with a cardiologist there.  2.  Essential hypertension, well controlled    Plan:  1.  Continue current medications  2.  1 week event monitor with further recommendations to follow  3.  Establish with cardiologist in [place], [place] following move       History of Present Illness    Ms. Destiney Agosto is a 83 year old female with history of PSVT, currently suppressed with metoprolol and low-dose Tambocor who presents today for follow-up visit.  She has been doing fairly well over the course of the last year but informs me today that she and her  will be moving at the end of the month to [facility] permanently.  They have been spending their luis there but are now moving there to live year around.  She does continue to note symptoms of palpitations or fluttering when she lays down in bed at night.  Does not notice them during the daytime when she is up and about.  Denies any lightheadedness or feeling of near syncope.  No symptoms of exertional  "chest discomfort or dyspnea.    ECG (personally reviewed): ECG today demonstrates normal sinus rhythm, normal axis and intervals.    Cardiac Imaging Studies (personally reviewed): No recent cardiac imaging     Physical Examination Review of Systems   /72 (BP Location: Left arm, Patient Position: Sitting, Cuff Size: Adult Regular)   Pulse 60   Resp 16   Ht 1.549 m (5' 1\")   Wt 66.7 kg (147 lb)   BMI 27.78 kg/m    Body mass index is 27.78 kg/m .  Wt Readings from Last 3 Encounters:   09/12/23 66.7 kg (147 lb)   08/19/22 66.7 kg (147 lb)   08/11/22 67.1 kg (148 lb)     General Appearance:   Awake, Alert, No acute distress.   HEENT:  No scleral icterus; the mucous membranes were pink and moist.   Neck: No cervical bruits or jugular venous distention    Chest: The spine was straight. The chest was symmetric.   Lungs:   Respirations unlabored; the lungs are clear to auscultation. No wheezing   Cardiovascular:   Regular rate and rhythm.  S1, S2 normal.  No murmur or gallop   Abdomen:  No organomegaly, masses, bruits, or tenderness. Bowels sounds are present   Extremities: No peripheral edema bilaterally   Skin: No xanthelasma. Warm, Dry.   Musculoskeletal: No tenderness.   Neurologic: Mood and affect are appropriate.    Enc Vitals  BP: 132/72  Pulse: 60  Resp: 16  Weight: 66.7 kg (147 lb) (Taken at home this AM.)  Height: 154.9 cm (5' 1\")                                         Medical History  Surgical History Family History Social History   Past Medical History:   Diagnosis Date    Arrhythmia 01/01/2007    PVC's with bigeminy, trigeminy    Arthritis     Heart murmur     Hyperlipidemia     Hypertension     Irregular heart beat     Motion sickness     Thrombosis     Uncomplicated asthma     Past Surgical History:   Procedure Laterality Date    REVERSE ARTHROPLASTY SHOULDER Left 8/11/2022    Procedure: REVERSE TOTAL SHOULDER ARTHROPLASTY LEFT;  Surgeon: Jn Gallegos MD;  Location: [facility] Main OR    Family " History   Problem Relation Age of Onset    Cerebrovascular Disease Mother     Aortic aneurysm Father     Social History     Socioeconomic History    Marital status:      Spouse name: Not on file    Number of children: Not on file    Years of education: Not on file    Highest education level: Not on file   Occupational History    Not on file   Tobacco Use    Smoking status: Never    Smokeless tobacco: Never   Vaping Use    Vaping Use: Never used   Substance and Sexual Activity    Alcohol use: Yes     Comment: 4-5 a week    Drug use: Never    Sexual activity: Not on file   Other Topics Concern    Not on file   Social History Narrative    Not on file     Social Determinants of Health     Financial Resource Strain: Not on file   Food Insecurity: Not on file   Transportation Needs: Not on file   Physical Activity: Not on file   Stress: Not on file   Social Connections: Not on file   Intimate Partner Violence: Not on file   Housing Stability: Not on file          Medications  Allergies   Current Outpatient Medications   Medication Sig Dispense Refill    acetaminophen (TYLENOL 8 HOUR ARTHRITIS PAIN) 650 MG CR tablet Take 650 mg by mouth every 8 hours as needed for mild pain      ascorbic acid, vitamin C, (ASCORBIC ACID WITH CARYN HIPS) 500 MG tablet [ASCORBIC ACID, VITAMIN C, (ASCORBIC ACID WITH CARYN HIPS) 500 MG TABLET] Take 500 mg by mouth daily.      atorvastatin (LIPITOR) 10 MG tablet Take 10 mg by mouth daily      biotin 5,000 mcg TbDL [BIOTIN 5,000 MCG TBDL] Take 1 tablet by mouth daily.      CALCIUM-MAGNESIUM-ZINC ORAL [CALCIUM-MAGNESIUM-ZINC ORAL] Take 1 tablet by mouth 2 (two) times a day.      cholecalciferol, vitamin D3, (VITAMIN D3) 2,000 unit capsule Take 2,000 Units by mouth daily      citalopram (CELEXA) 10 MG tablet [CITALOPRAM (CELEXA) 10 MG TABLET] Take 10 mg by mouth daily.      flecainide (TAMBOCOR) 50 MG tablet Take 1 tablet by mouth twice daily. 180 tablet 3    folic acid/multivit-min/lutein  (CENTRUM SILVER ORAL) [FOLIC ACID/MULTIVIT-MIN/LUTEIN (CENTRUM SILVER ORAL)] Take 1 tablet by mouth daily.      garlic 1,000 mg cap Take 1 capsule by mouth daily      glycerin-hypromellose- (VISINE) 0.2-0.2-1 % SOLN ophthalmic solution Place 1 drop into both eyes as needed for dry eyes (For Dry Eyes. REFRESH DROPS.)      metoprolol succinate ER (TOPROL XL) 25 MG 24 hr tablet Take 1 tablet by mouth daily. 90 tablet 3    omega 3 1200 MG CAPS Take 1 capsule by mouth daily        Allergies   Allergen Reactions    Albuterol     Ibuprofen      Other reaction(s): possible TGA total global amnesia          Lab Results    Chemistry/lipid CBC Cardiac Enzymes/BNP/TSH/INR   Recent Labs   Lab Test 07/26/23  1213   TRIG 252*   LDL 63   BUN 26.9*      CO2 28    Recent Labs   Lab Test 08/12/22  0516   HGB 12.0    No results for input(s): CKTOTAL, CKMB, TROPONINI, BNP, TSH, INR in the last 26872 hours.    Invalid input(s): CKMBINDEX     A total of 30 minutes was spent reviewing patient's medical records, obtaining history and performing examination, as well as discussing diagnoses/ recommendations with patient and answering all questions.                        Thank you for allowing me to participate in the care of your patient.      Sincerely,     Tianna Kumar MD     [facility]  cc:   Tianna Kumar MD  [address], [address]  [place] [address]

## 2023-09-12 NOTE — PATIENT INSTRUCTIONS
1.  Continue current medications  2.  Schedule I week event monitor with further recommendations to follow.

## 2023-09-13 LAB
ATRIAL RATE - MUSE: 58 BPM
DIASTOLIC BLOOD PRESSURE - MUSE: NORMAL MMHG
INTERPRETATION ECG - MUSE: NORMAL
P AXIS - MUSE: 64 DEGREES
PR INTERVAL - MUSE: 176 MS
QRS DURATION - MUSE: 88 MS
QT - MUSE: 420 MS
QTC - MUSE: 412 MS
R AXIS - MUSE: 30 DEGREES
SYSTOLIC BLOOD PRESSURE - MUSE: NORMAL MMHG
T AXIS - MUSE: 43 DEGREES
VENTRICULAR RATE- MUSE: 58 BPM

## 2023-09-14 ENCOUNTER — HOSPITAL ENCOUNTER (OUTPATIENT)
Dept: CARDIOLOGY | Facility: HOSPITAL | Age: 83
Discharge: HOME | End: 2023-09-14
Attending: INTERNAL MEDICINE
Payer: MEDICARE

## 2023-09-14 DIAGNOSIS — I47.10 SVT (SUPRAVENTRICULAR TACHYCARDIA) (H): ICD-10-CM

## 2023-09-14 PROCEDURE — 999N000096 CARDIAC MOBILE TELEMETRY MONITOR

## 2023-09-18 ENCOUNTER — TELEPHONE (OUTPATIENT)
Dept: CARDIOLOGY | Facility: CLINIC | Age: 83
End: 2023-09-18
Payer: MEDICARE

## 2023-09-18 NOTE — TELEPHONE ENCOUNTER
[facility] Call Center    Phone Message    May a detailed message be left on voicemail: yes     Reason for Call: Other: Patient forgot her remote at home. Patient wants to know what she should do. Please reach out.      Action Taken: Other: Cardiology     Travel Screening: Not Applicable    Thank you!  [facility]

## 2023-09-18 NOTE — TELEPHONE ENCOUNTER
Per Dr. Kumar's 9-12-23 visit note:  Plan:  1.  Continue current medications  2.  1 week event monitor with further recommendations to follow  3.  Establish with cardiologist in [place], [place] following move.    Return call to patient who stated she is having issues with her monitor which was placed at [facility] on 9-14-23 - thinks it needs charging because she left her charge cord at home - patient requesting to stop in at [facility] to address issues.    Patient provided contact # for [facility] card techs to call and address issues.  mg

## 2023-09-22 PROCEDURE — 93228 REMOTE 30 DAY ECG REV/REPORT: CPT | Performed by: INTERNAL MEDICINE

## 2024-06-18 DIAGNOSIS — I47.10 PAROXYSMAL SUPRAVENTRICULAR TACHYCARDIA (H): ICD-10-CM

## 2024-06-18 RX ORDER — METOPROLOL SUCCINATE 25 MG/1
TABLET, EXTENDED RELEASE ORAL
Qty: 90 TABLET | Refills: 2 | OUTPATIENT
Start: 2024-06-18

## 2024-06-18 NOTE — TELEPHONE ENCOUNTER
"Per Dr. Kumar's 9-12-23 visit note: pt \"tells me they will be relocating to [facility] which is where they have spend their luis in the past but will live there now full-time. Recommended establishing with a cardiologist there.\"    Phone call to patient who confirmed she has re-located to [place] but has not established care with new cardiologist - patient has established care with new PCP and agreed to contact him for Rx refills.  mg  "

## 2024-07-09 DIAGNOSIS — I47.10 PAROXYSMAL SUPRAVENTRICULAR TACHYCARDIA (H): ICD-10-CM

## 2024-07-09 RX ORDER — FLECAINIDE ACETATE 50 MG/1
TABLET ORAL
Qty: 180 TABLET | Refills: 2 | OUTPATIENT
Start: 2024-07-09

## 2024-07-09 NOTE — TELEPHONE ENCOUNTER
"Rx refused.    Last seen 9/12/23 - rec Establish with cardiologist in [place], [place] following move     Shonda Pinon RN     MG    6/18/24  2:52 PM  Note  Per Dr. Kumar's 9-12-23 visit note: pt \"tells me they will be relocating to [facility] which is where they have spend their luis in the past but will live there now full-time. Recommended establishing with a cardiologist there.\"     Phone call to patient who confirmed she has re-located to [place] but has not established care with new cardiologist - patient has established care with new PCP and agreed to contact him for Rx refills.  mg        "

## (undated) DEVICE — SU MONOCRYL 3-0 PS-2 18" UND MCP497G

## (undated) DEVICE — DRESSING MEPILEX BORDER POST-OP 4X8

## (undated) DEVICE — Device

## (undated) DEVICE — SOL NACL 0.9% INJ 1000ML BAG 2B1324X

## (undated) DEVICE — GLOVE SURG PI ULTRA TOUCH M SZ 8 LF

## (undated) DEVICE — DRILL BIT PERIPHERAL SCREW 3.2MM MWJ126

## (undated) DEVICE — GUIDE PIN STERILE 3X75MM

## (undated) DEVICE — HOLDER LIMB VELCRO OR 0814-1533

## (undated) DEVICE — A3 SUPPLIES- SEE NURSING INFO PAGE

## (undated) DEVICE — CUSTOM PACK OPEN SHOULDER SOP5BOSHEB

## (undated) DEVICE — SUCTION IRR SYSTEM W/O TIP INTERPULSE HANDPIECE 0210-100-000

## (undated) DEVICE — SOL NACL 0.9% IRRIG 1000ML BOTTLE 2F7124

## (undated) DEVICE — GLOVE BIOGEL PI ULTRATOUCH G SZ 8.5 42185

## (undated) DEVICE — GLOVE UNDER INDICATOR PI SZ 8.5 LF 41685

## (undated) DEVICE — SOL WATER IRRIG 1000ML BOTTLE 2F7114

## (undated) DEVICE — SUTURE VICRYL+ 2-0 27IN CT-1 UND VCP259H

## (undated) DEVICE — PLATE GROUNDING ADULT W/CORD 9165L

## (undated) DEVICE — PREP CHLORAPREP W/ORANGE TINT 10.5ML 930715

## (undated) DEVICE — BLADE SAW SAGITTAL STRK WIDE 25.4X85X1.2MM 2108-151-000

## (undated) DEVICE — SUCTION MANIFOLD NEPTUNE 2 SYS 4 PORT 0702-020-000

## (undated) DEVICE — BONE CLEANING TIP INTERPULSE  0210-010-000

## (undated) DEVICE — GUIDEWIRE TORNIER AEQUALIS PERFORM +  2.5X220MM DWD017

## (undated) DEVICE — DRSG STERI STRIP 1/2X4" R1547

## (undated) DEVICE — MAT FLOOR SURGICAL 40X38 0702140238

## (undated) DEVICE — DRESSING MEPILEX AG SILVER 4X8 395890

## (undated) DEVICE — GOWN SURGICAL SMARTGOWN 2XL 89075

## (undated) DEVICE — SU FIBERWIRE 2 38" T-8 NDL  AR-7206

## (undated) DEVICE — GLOVE BIOGEL INDICATOR 7.5 LF 41675

## (undated) DEVICE — SUTURE VICRYL+ 0 27IN CT-1 UND VCP260H

## (undated) DEVICE — NEEDLE SUTURE ANCHOR 1824-5DC

## (undated) RX ORDER — PROPOFOL 10 MG/ML
INJECTION, EMULSION INTRAVENOUS
Status: DISPENSED
Start: 2022-08-11

## (undated) RX ORDER — DEXAMETHASONE SODIUM PHOSPHATE 10 MG/ML
INJECTION, SOLUTION INTRAMUSCULAR; INTRAVENOUS
Status: DISPENSED
Start: 2022-08-11

## (undated) RX ORDER — ONDANSETRON 2 MG/ML
INJECTION INTRAMUSCULAR; INTRAVENOUS
Status: DISPENSED
Start: 2022-08-11